# Patient Record
Sex: FEMALE | Race: WHITE | NOT HISPANIC OR LATINO | Employment: OTHER | URBAN - METROPOLITAN AREA
[De-identification: names, ages, dates, MRNs, and addresses within clinical notes are randomized per-mention and may not be internally consistent; named-entity substitution may affect disease eponyms.]

---

## 2017-03-27 ENCOUNTER — TELEPHONE (OUTPATIENT)
Dept: RHEUMATOLOGY | Facility: CLINIC | Age: 62
End: 2017-03-27

## 2022-11-08 ENCOUNTER — OFFICE VISIT (OUTPATIENT)
Dept: CARDIOLOGY | Facility: CLINIC | Age: 67
End: 2022-11-08
Payer: MEDICARE

## 2022-11-08 ENCOUNTER — LAB VISIT (OUTPATIENT)
Dept: LAB | Facility: HOSPITAL | Age: 67
End: 2022-11-08
Payer: MEDICARE

## 2022-11-08 VITALS
HEART RATE: 82 BPM | DIASTOLIC BLOOD PRESSURE: 81 MMHG | HEIGHT: 60 IN | BODY MASS INDEX: 38.04 KG/M2 | OXYGEN SATURATION: 96 % | SYSTOLIC BLOOD PRESSURE: 155 MMHG

## 2022-11-08 DIAGNOSIS — R07.89 CHEST DISCOMFORT: ICD-10-CM

## 2022-11-08 DIAGNOSIS — R79.9 ABNORMAL FINDING OF BLOOD CHEMISTRY, UNSPECIFIED: ICD-10-CM

## 2022-11-08 DIAGNOSIS — R06.02 SOB (SHORTNESS OF BREATH): ICD-10-CM

## 2022-11-08 DIAGNOSIS — R06.09 DOE (DYSPNEA ON EXERTION): ICD-10-CM

## 2022-11-08 DIAGNOSIS — I10 PRIMARY HYPERTENSION: ICD-10-CM

## 2022-11-08 DIAGNOSIS — R07.89 CHEST TIGHTNESS: ICD-10-CM

## 2022-11-08 DIAGNOSIS — R06.02 SOB (SHORTNESS OF BREATH): Primary | ICD-10-CM

## 2022-11-08 LAB
ANION GAP SERPL CALC-SCNC: 14 MMOL/L (ref 8–16)
BASOPHILS # BLD AUTO: 0.04 K/UL (ref 0–0.2)
BASOPHILS NFR BLD: 0.5 % (ref 0–1.9)
BUN SERPL-MCNC: 36 MG/DL (ref 8–23)
CALCIUM SERPL-MCNC: 10 MG/DL (ref 8.7–10.5)
CHLORIDE SERPL-SCNC: 108 MMOL/L (ref 95–110)
CHOLEST SERPL-MCNC: 188 MG/DL (ref 120–199)
CHOLEST/HDLC SERPL: 4.4 {RATIO} (ref 2–5)
CO2 SERPL-SCNC: 21 MMOL/L (ref 23–29)
CREAT SERPL-MCNC: 1.2 MG/DL (ref 0.5–1.4)
DIFFERENTIAL METHOD: ABNORMAL
EOSINOPHIL # BLD AUTO: 0.4 K/UL (ref 0–0.5)
EOSINOPHIL NFR BLD: 5.2 % (ref 0–8)
ERYTHROCYTE [DISTWIDTH] IN BLOOD BY AUTOMATED COUNT: 13 % (ref 11.5–14.5)
EST. GFR  (NO RACE VARIABLE): 49 ML/MIN/1.73 M^2
ESTIMATED AVG GLUCOSE: 114 MG/DL (ref 68–131)
GLUCOSE SERPL-MCNC: 90 MG/DL (ref 70–110)
HBA1C MFR BLD: 5.6 % (ref 4–5.6)
HCT VFR BLD AUTO: 39.5 % (ref 37–48.5)
HDLC SERPL-MCNC: 43 MG/DL (ref 40–75)
HDLC SERPL: 22.9 % (ref 20–50)
HGB BLD-MCNC: 12.5 G/DL (ref 12–16)
IMM GRANULOCYTES # BLD AUTO: 0.03 K/UL (ref 0–0.04)
IMM GRANULOCYTES NFR BLD AUTO: 0.4 % (ref 0–0.5)
LDLC SERPL CALC-MCNC: 100.4 MG/DL (ref 63–159)
LYMPHOCYTES # BLD AUTO: 1.8 K/UL (ref 1–4.8)
LYMPHOCYTES NFR BLD: 24.3 % (ref 18–48)
MCH RBC QN AUTO: 30.4 PG (ref 27–31)
MCHC RBC AUTO-ENTMCNC: 31.6 G/DL (ref 32–36)
MCV RBC AUTO: 96 FL (ref 82–98)
MONOCYTES # BLD AUTO: 0.7 K/UL (ref 0.3–1)
MONOCYTES NFR BLD: 8.9 % (ref 4–15)
NEUTROPHILS # BLD AUTO: 4.6 K/UL (ref 1.8–7.7)
NEUTROPHILS NFR BLD: 60.7 % (ref 38–73)
NONHDLC SERPL-MCNC: 145 MG/DL
NRBC BLD-RTO: 0 /100 WBC
PLATELET # BLD AUTO: 356 K/UL (ref 150–450)
PMV BLD AUTO: 10.6 FL (ref 9.2–12.9)
POTASSIUM SERPL-SCNC: 4.5 MMOL/L (ref 3.5–5.1)
RBC # BLD AUTO: 4.11 M/UL (ref 4–5.4)
SODIUM SERPL-SCNC: 143 MMOL/L (ref 136–145)
TRIGL SERPL-MCNC: 223 MG/DL (ref 30–150)
WBC # BLD AUTO: 7.52 K/UL (ref 3.9–12.7)

## 2022-11-08 PROCEDURE — 99999 PR PBB SHADOW E&M-NEW PATIENT-LVL IV: CPT | Mod: PBBFAC,GC,, | Performed by: INTERNAL MEDICINE

## 2022-11-08 PROCEDURE — 3288F FALL RISK ASSESSMENT DOCD: CPT | Mod: CPTII,GC,S$GLB, | Performed by: INTERNAL MEDICINE

## 2022-11-08 PROCEDURE — 3079F PR MOST RECENT DIASTOLIC BLOOD PRESSURE 80-89 MM HG: ICD-10-PCS | Mod: CPTII,GC,S$GLB, | Performed by: INTERNAL MEDICINE

## 2022-11-08 PROCEDURE — 83036 HEMOGLOBIN GLYCOSYLATED A1C: CPT | Performed by: INTERNAL MEDICINE

## 2022-11-08 PROCEDURE — 3079F DIAST BP 80-89 MM HG: CPT | Mod: CPTII,GC,S$GLB, | Performed by: INTERNAL MEDICINE

## 2022-11-08 PROCEDURE — 4010F ACE/ARB THERAPY RXD/TAKEN: CPT | Mod: CPTII,GC,S$GLB, | Performed by: INTERNAL MEDICINE

## 2022-11-08 PROCEDURE — 99999 PR PBB SHADOW E&M-NEW PATIENT-LVL IV: ICD-10-PCS | Mod: PBBFAC,GC,, | Performed by: INTERNAL MEDICINE

## 2022-11-08 PROCEDURE — 3077F SYST BP >= 140 MM HG: CPT | Mod: CPTII,GC,S$GLB, | Performed by: INTERNAL MEDICINE

## 2022-11-08 PROCEDURE — 1101F PR PT FALLS ASSESS DOC 0-1 FALLS W/OUT INJ PAST YR: ICD-10-PCS | Mod: CPTII,GC,S$GLB, | Performed by: INTERNAL MEDICINE

## 2022-11-08 PROCEDURE — 3044F HG A1C LEVEL LT 7.0%: CPT | Mod: CPTII,GC,S$GLB, | Performed by: INTERNAL MEDICINE

## 2022-11-08 PROCEDURE — 80061 LIPID PANEL: CPT | Performed by: INTERNAL MEDICINE

## 2022-11-08 PROCEDURE — 85025 COMPLETE CBC W/AUTO DIFF WBC: CPT | Performed by: INTERNAL MEDICINE

## 2022-11-08 PROCEDURE — 99204 PR OFFICE/OUTPT VISIT, NEW, LEVL IV, 45-59 MIN: ICD-10-PCS | Mod: 25,GC,S$GLB, | Performed by: INTERNAL MEDICINE

## 2022-11-08 PROCEDURE — 1159F MED LIST DOCD IN RCRD: CPT | Mod: CPTII,GC,S$GLB, | Performed by: INTERNAL MEDICINE

## 2022-11-08 PROCEDURE — 1126F PR PAIN SEVERITY QUANTIFIED, NO PAIN PRESENT: ICD-10-PCS | Mod: CPTII,GC,S$GLB, | Performed by: INTERNAL MEDICINE

## 2022-11-08 PROCEDURE — 3077F PR MOST RECENT SYSTOLIC BLOOD PRESSURE >= 140 MM HG: ICD-10-PCS | Mod: CPTII,GC,S$GLB, | Performed by: INTERNAL MEDICINE

## 2022-11-08 PROCEDURE — 1126F AMNT PAIN NOTED NONE PRSNT: CPT | Mod: CPTII,GC,S$GLB, | Performed by: INTERNAL MEDICINE

## 2022-11-08 PROCEDURE — 99204 OFFICE O/P NEW MOD 45 MIN: CPT | Mod: 25,GC,S$GLB, | Performed by: INTERNAL MEDICINE

## 2022-11-08 PROCEDURE — 3008F BODY MASS INDEX DOCD: CPT | Mod: CPTII,GC,S$GLB, | Performed by: INTERNAL MEDICINE

## 2022-11-08 PROCEDURE — 1159F PR MEDICATION LIST DOCUMENTED IN MEDICAL RECORD: ICD-10-PCS | Mod: CPTII,GC,S$GLB, | Performed by: INTERNAL MEDICINE

## 2022-11-08 PROCEDURE — 80048 BASIC METABOLIC PNL TOTAL CA: CPT | Performed by: INTERNAL MEDICINE

## 2022-11-08 PROCEDURE — 1101F PT FALLS ASSESS-DOCD LE1/YR: CPT | Mod: CPTII,GC,S$GLB, | Performed by: INTERNAL MEDICINE

## 2022-11-08 PROCEDURE — 3044F PR MOST RECENT HEMOGLOBIN A1C LEVEL <7.0%: ICD-10-PCS | Mod: CPTII,GC,S$GLB, | Performed by: INTERNAL MEDICINE

## 2022-11-08 PROCEDURE — 3288F PR FALLS RISK ASSESSMENT DOCUMENTED: ICD-10-PCS | Mod: CPTII,GC,S$GLB, | Performed by: INTERNAL MEDICINE

## 2022-11-08 PROCEDURE — 93000 ELECTROCARDIOGRAM COMPLETE: CPT | Mod: S$GLB,,, | Performed by: INTERNAL MEDICINE

## 2022-11-08 PROCEDURE — 93000 EKG 12-LEAD: ICD-10-PCS | Mod: S$GLB,,, | Performed by: INTERNAL MEDICINE

## 2022-11-08 PROCEDURE — 3008F PR BODY MASS INDEX (BMI) DOCUMENTED: ICD-10-PCS | Mod: CPTII,GC,S$GLB, | Performed by: INTERNAL MEDICINE

## 2022-11-08 PROCEDURE — 4010F PR ACE/ARB THEARPY RXD/TAKEN: ICD-10-PCS | Mod: CPTII,GC,S$GLB, | Performed by: INTERNAL MEDICINE

## 2022-11-08 PROCEDURE — 36415 COLL VENOUS BLD VENIPUNCTURE: CPT | Performed by: INTERNAL MEDICINE

## 2022-11-08 RX ORDER — LOSARTAN POTASSIUM AND HYDROCHLOROTHIAZIDE 25; 100 MG/1; MG/1
1 TABLET ORAL DAILY
COMMUNITY

## 2022-11-08 RX ORDER — NITROGLYCERIN 0.4 MG/1
0.4 TABLET SUBLINGUAL
Status: SHIPPED | OUTPATIENT
Start: 2022-11-08

## 2022-11-08 NOTE — PROGRESS NOTES
Cardiology Clinic Note  Reason for Visit: Shortness of Breath    HPI:   Mrs Mathias is a 68 yo F with PMH of HTN, OA, GERD/gastritis presenting with shortness of breath.     She was experienced LAZAR with associated chest tightness, intermittent leg swelling with associated weight gain, and intermittent palpitations over the past 1-2 years. 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     She states symptoms started around last summer (June 2021) around the time she received her COVID booster. She unfortunately. Lost her job when COVID hit in 2020, and has been a much less active lifestyle since that time. She describes shortness of breath with exertion including examples such as walking to the garage or sweeping in the kitchen. Even walking from the lobby to the room has her SOB today. She intermittently has associated chest tightness and pain into her R arm. She feels palpitations sometimes when exerting herself too, but no diaphoresis, syncope, or falls. She had a stress test many years ago and reports it was normal. No Hx of premature CAD and non-smoker.     ROS:    Constitution: Negative for fever, chills, weight loss or gain.   HENT: Negative for sore throat, rhinorrhea, or headache.  Eyes: Negative for blurred or double vision.   Cardiovascular: See above  Pulmonary: Negative for SOB   Gastrointestinal: Negative for abdominal pain, nausea, vomiting, or diarrhea.   : Negative for dysuria.   Neurological: Negative for focal weakness or sensory changes.  PMH:     Past Medical History:   Diagnosis Date    Allergy     Arthritis     Encounter for blood transfusion     Gastric ulcer     Hypertension     Migraine headache      Past Surgical History:   Procedure Laterality Date    HYSTERECTOMY      TONSILLECTOMY       Allergies:     Review of patient's allergies indicates:   Allergen  Reactions    Latex, natural rubber Other (See Comments)     Blisters      Macrolide antibiotics Nausea And Vomiting     Erythromycin      Penicillins Itching    Sulfa (sulfonamide antibiotics) Itching     Medications:     Current Outpatient Medications on File Prior to Visit   Medication Sig Dispense Refill    diclofenac (VOLTAREN) 50 MG EC tablet TAKE 1 TABLET TWICE DAILY (Patient taking differently: 100 mg.) 180 tablet 0    fenofibrate (TRICOR) 145 MG tablet Take 1 tablet by mouth once daily. 30 tablet 3    losartan-hydrochlorothiazide 100-25 mg (HYZAAR) 100-25 mg per tablet Take 1 tablet by mouth once daily.      multivitamin capsule Take 1 capsule by mouth once daily.      omeprazole (PRILOSEC) 40 MG capsule Take 1 capsule (40 mg total) by mouth once daily. 90 capsule 3    topiramate (TOPAMAX) 50 MG tablet Take 1 tablet (50 mg total) by mouth 2 (two) times daily. 90 tablet 3    lisinopril-hydrochlorothiazide (PRINZIDE,ZESTORETIC) 10-12.5 mg per tablet Take 1 tablet by mouth once daily. (Patient not taking: Reported on 11/8/2022) 90 tablet 3    sumatriptan (IMITREX) 50 MG tablet Take by mouth as needed.      tramadol (ULTRAM) 50 mg tablet TAKE ONE TABLET BY MOUTH THREE TIMES DAILY AS NEEDED (Patient not taking: Reported on 11/8/2022) 90 tablet 3     No current facility-administered medications on file prior to visit.     Social History:     Social History     Tobacco Use    Smoking status: Never    Smokeless tobacco: Never   Substance Use Topics    Alcohol use: No     Alcohol/week: 0.0 standard drinks     Family History:     Family History   Problem Relation Age of Onset    Cancer Mother     Rheum arthritis Mother     Heart disease Mother     Cancer Father 74        colon    Heart failure Father     Stroke Father     Alzheimer's disease Father     No Known Problems Sister     No Known Problems Daughter     No Known Problems Son      Physical Exam:   BP (!) 155/81 (BP Location: Left arm, Patient Position: Sitting,  BP Method: Large (Automatic))   Pulse 82   Ht 5' (1.524 m)   LMP  (LMP Unknown)   SpO2 96%   BMI 38.04 kg/m²        Physical Exam   Constitutional: She is oriented to person, place, and time.   HENT:   Head: Normocephalic.   Cardiovascular: Normal rate and regular rhythm.   Murmur heard.  Holosystolic murmur is present at the lower left sternal border.  Pulses:       Radial pulses are 2+ on the right side and 2+ on the left side.        Dorsalis pedis pulses are 2+ on the right side and 2+ on the left side.        Posterior tibial pulses are 2+ on the right side and 2+ on the left side.   Pulmonary/Chest: Effort normal and breath sounds normal. No respiratory distress. She has no rales.   Abdominal: Soft. Normal appearance and bowel sounds are normal.   Musculoskeletal:      Cervical back: Neck supple.      Right lower leg: No edema.      Left lower leg: No edema.   Neurological: She is alert and oriented to person, place, and time.   Skin: Skin is warm.      Labs:     Lab Results   Component Value Date     11/08/2022    K 4.5 11/08/2022     11/08/2022    CO2 21 (L) 11/08/2022    BUN 36 (H) 11/08/2022    CREATININE 1.2 11/08/2022    ANIONGAP 14 11/08/2022     Lab Results   Component Value Date    HGBA1C 5.6 11/08/2022     Lab Results   Component Value Date    BNP 24 03/10/2014    Lab Results   Component Value Date    WBC 7.52 11/08/2022    HGB 12.5 11/08/2022    HCT 39.5 11/08/2022     11/08/2022    GRAN 4.6 11/08/2022    GRAN 60.7 11/08/2022     Lab Results   Component Value Date    CHOL 188 11/08/2022    HDL 43 11/08/2022    LDLCALC 100.4 11/08/2022    TRIG 223 (H) 11/08/2022          Imaging:   TTE 3/2014    1 - Normal left ventricular systolic function (EF 60-65%).     2 - Normal left ventricular diastolic function.     3 - Mild tricuspid regurgitation.     EKG 2014  NSR, incomplete RBBB  Assessment:     1. SOB (shortness of breath)  IN OFFICE EKG 12-LEAD (to Muse)    CBC Auto  Differential    BASIC METABOLIC PANEL    HEMOGLOBIN A1C    Lipid Panel    Stress Echo Which stress agent will be used? Treadmill Exercise; Color Flow Doppler? No      2. Chest discomfort  HEMOGLOBIN A1C    Lipid Panel    Stress Echo Which stress agent will be used? Treadmill Exercise; Color Flow Doppler? No      3. Primary hypertension        4. Abnormal finding of blood chemistry, unspecified  HEMOGLOBIN A1C          Plan:   Chest Discomfort  Dyspnea on Exertions  Palpitations  Symptoms concerning for angina; normal stress test reportedly previously  EKG in office with NSR, incomplete RBBB, non-specific ST changes, but no evidence of ischemia  Schedule exercise stress echocardiogram  Check CBC, BMP, BNP, lipid panel, and HbA1c    HTN (hypertension)  Continue Losartan-HCTZ 100-25 mg daily  Low salt diet    RTC PRN pending stress test results.    Discussed case with Dr Yanci Ojeda MD.    Tomer Gonzalez MD PGY5  Cardiovascular Medicine Fellow  [facility]  Pager: [phone]

## 2022-11-10 ENCOUNTER — HOSPITAL ENCOUNTER (OUTPATIENT)
Dept: CARDIOLOGY | Facility: HOSPITAL | Age: 67
Discharge: HOME | End: 2022-11-10
Attending: INTERNAL MEDICINE
Payer: MEDICARE

## 2022-11-10 VITALS — WEIGHT: 194 LBS | HEIGHT: 60 IN | BODY MASS INDEX: 38.09 KG/M2

## 2022-11-10 DIAGNOSIS — R07.89 CHEST DISCOMFORT: ICD-10-CM

## 2022-11-10 DIAGNOSIS — R06.02 SOB (SHORTNESS OF BREATH): ICD-10-CM

## 2022-11-10 LAB
ASCENDING AORTA: 3.06 CM
BSA FOR ECHO PROCEDURE: 1.93 M2
CV ECHO LV RWT: 0.32 CM
CV STRESS BASE HR: 71 BPM
DIASTOLIC BLOOD PRESSURE: 78 MMHG
DOP CALC LVOT AREA: 3.8 CM2
DOP CALC LVOT DIAMETER: 2.21 CM
E WAVE DECELERATION TIME: 231.12 MSEC
E/A RATIO: 0.8
E/E' RATIO: 6.6 M/S
ECHO LV POSTERIOR WALL: 0.76 CM (ref 0.6–1.1)
EJECTION FRACTION: 65 %
FRACTIONAL SHORTENING: 34 % (ref 28–44)
INTERVENTRICULAR SEPTUM: 0.78 CM (ref 0.6–1.1)
LA MAJOR: 4.76 CM
LA MINOR: 3.89 CM
LA WIDTH: 3.39 CM
LEFT ATRIUM SIZE: 2.97 CM
LEFT ATRIUM VOLUME INDEX MOD: 20.9 ML/M2
LEFT ATRIUM VOLUME INDEX: 19.9 ML/M2
LEFT ATRIUM VOLUME MOD: 38.42 CM3
LEFT ATRIUM VOLUME: 36.64 CM3
LEFT INTERNAL DIMENSION IN SYSTOLE: 3.09 CM (ref 2.1–4)
LEFT VENTRICLE DIASTOLIC VOLUME INDEX: 55.49 ML/M2
LEFT VENTRICLE DIASTOLIC VOLUME: 102.11 ML
LEFT VENTRICLE MASS INDEX: 63 G/M2
LEFT VENTRICLE SYSTOLIC VOLUME INDEX: 20.5 ML/M2
LEFT VENTRICLE SYSTOLIC VOLUME: 37.77 ML
LEFT VENTRICULAR INTERNAL DIMENSION IN DIASTOLE: 4.7 CM (ref 3.5–6)
LEFT VENTRICULAR MASS: 116.37 G
LV LATERAL E/E' RATIO: 6.6 M/S
LV SEPTAL E/E' RATIO: 6.6 M/S
MV A" WAVE DURATION": 11.7 MSEC
MV PEAK A VEL: 0.82 M/S
MV PEAK E VEL: 0.66 M/S
MV STENOSIS PRESSURE HALF TIME: 67.03 MS
MV VALVE AREA P 1/2 METHOD: 3.28 CM2
OHS CV CPX 1 MINUTE RECOVERY HEART RATE: 131 BPM
OHS CV CPX 85 PERCENT MAX PREDICTED HEART RATE MALE: 123
OHS CV CPX ESTIMATED METS: 7
OHS CV CPX MAX PREDICTED HEART RATE: 145
OHS CV CPX PATIENT IS FEMALE: 1
OHS CV CPX PATIENT IS MALE: 0
OHS CV CPX PEAK DIASTOLIC BLOOD PRESSURE: 88 MMHG
OHS CV CPX PEAK HEAR RATE: 148 BPM
OHS CV CPX PEAK RATE PRESSURE PRODUCT: NORMAL
OHS CV CPX PEAK SYSTOLIC BLOOD PRESSURE: 162 MMHG
OHS CV CPX PERCENT MAX PREDICTED HEART RATE ACHIEVED: 102
OHS CV CPX RATE PRESSURE PRODUCT PRESENTING: 9088
PISA TR MAX VEL: 2.27 M/S
PULM VEIN S/D RATIO: 0.96
PV PEAK D VEL: 0.45 M/S
PV PEAK S VEL: 0.43 M/S
RA MAJOR: 4.76 CM
RA PRESSURE: 3 MMHG
RA WIDTH: 3.02 CM
RIGHT VENTRICULAR END-DIASTOLIC DIMENSION: 2.65 CM
RV TISSUE DOPPLER FREE WALL SYSTOLIC VELOCITY 1 (APICAL 4 CHAMBER VIEW): 12.47 CM/S
SINUS: 2.63 CM
STJ: 2.82 CM
STRESS ECHO POST EXERCISE DUR MIN: 4 MINUTES
STRESS ECHO POST EXERCISE DUR SEC: 15 SECONDS
SYSTOLIC BLOOD PRESSURE: 128 MMHG
TDI LATERAL: 0.1 M/S
TDI SEPTAL: 0.1 M/S
TDI: 0.1 M/S
TR MAX PG: 21 MMHG
TRICUSPID ANNULAR PLANE SYSTOLIC EXCURSION: 2.27 CM
TV REST PULMONARY ARTERY PRESSURE: 24 MMHG

## 2022-11-10 PROCEDURE — 93351 STRESS TTE COMPLETE: CPT | Mod: 26,,, | Performed by: INTERNAL MEDICINE

## 2022-11-10 PROCEDURE — 93351 STRESS ECHO (CUPID ONLY): ICD-10-PCS | Mod: 26,,, | Performed by: INTERNAL MEDICINE

## 2022-11-10 PROCEDURE — 93351 STRESS TTE COMPLETE: CPT

## 2022-11-13 ENCOUNTER — PATIENT MESSAGE (OUTPATIENT)
Dept: CARDIOLOGY | Facility: CLINIC | Age: 67
End: 2022-11-13
Payer: MEDICARE

## 2022-11-14 ENCOUNTER — TELEPHONE (OUTPATIENT)
Dept: CARDIOLOGY | Facility: HOSPITAL | Age: 67
End: 2022-11-14
Payer: MEDICARE

## 2022-11-14 NOTE — TELEPHONE ENCOUNTER
Called patient to discuss labs and stress test.     Labs with Cr 1.2 and . Last labs were 6 yrs ago here (has outside PCP though) with Cr 0.7 and . She has some soreness/discomfort in her lower back and neck/jaw, but otherwise is doing ok.     Advised to contact PCP for UA to monitor renal function and to say hydrated in the meantime. MEENA with normal EF, negative for ischemia. Advised to start exercise program with dietary changes we had discussed previously. Also asked to keep me posted in neck/jaw symptoms persist with exercise and the try topical anti-inflammatories in the meantime.     Will follow-up in clinic, but reminded her I'm always available if needing to discuss anything further.    Tomer Gonzalez MD PGY5  Cardiovascular Medicine Fellow  [facility]  Pager: [phone]

## 2022-11-16 DIAGNOSIS — R94.4 ABNORMAL RENAL FUNCTION TEST: Primary | ICD-10-CM

## 2023-07-31 ENCOUNTER — TELEPHONE (OUTPATIENT)
Dept: OPHTHALMOLOGY | Facility: CLINIC | Age: 68
End: 2023-07-31
Payer: MEDICARE

## 2023-07-31 NOTE — TELEPHONE ENCOUNTER
----- Message from Jeane Hernandez sent at 7/31/2023 11:41 AM CDT -----  Regarding: Urgent Appt  Contact: Pt  Pt is requesting a callback regarding R eye pain. Pt stated she also have some brown spotting and irritation. Please adv pt      Confirmed contact below:   Contact Name:Danyell Mathias  Phone Number: [phone]

## 2023-11-29 ENCOUNTER — OFFICE VISIT (OUTPATIENT)
Dept: GASTROENTEROLOGY | Facility: CLINIC | Age: 68
End: 2023-11-29
Payer: MEDICARE

## 2023-11-29 VITALS — WEIGHT: 194 LBS | HEIGHT: 60 IN | BODY MASS INDEX: 38.09 KG/M2

## 2023-11-29 DIAGNOSIS — R19.5 POSITIVE FIT (FECAL IMMUNOCHEMICAL TEST): Primary | ICD-10-CM

## 2023-11-29 DIAGNOSIS — Z80.0 FAMILY HISTORY OF COLON CANCER IN FATHER: ICD-10-CM

## 2023-11-29 PROCEDURE — 1159F PR MEDICATION LIST DOCUMENTED IN MEDICAL RECORD: ICD-10-PCS | Mod: CPTII,S$GLB,,

## 2023-11-29 PROCEDURE — 99999 PR PBB SHADOW E&M-EST. PATIENT-LVL III: ICD-10-PCS | Mod: PBBFAC,,,

## 2023-11-29 PROCEDURE — 3008F BODY MASS INDEX DOCD: CPT | Mod: CPTII,S$GLB,,

## 2023-11-29 PROCEDURE — 1159F MED LIST DOCD IN RCRD: CPT | Mod: CPTII,S$GLB,,

## 2023-11-29 PROCEDURE — 99203 OFFICE O/P NEW LOW 30 MIN: CPT | Mod: S$GLB,,,

## 2023-11-29 PROCEDURE — 1101F PT FALLS ASSESS-DOCD LE1/YR: CPT | Mod: CPTII,S$GLB,,

## 2023-11-29 PROCEDURE — 99999 PR PBB SHADOW E&M-EST. PATIENT-LVL III: CPT | Mod: PBBFAC,,,

## 2023-11-29 PROCEDURE — 1126F PR PAIN SEVERITY QUANTIFIED, NO PAIN PRESENT: ICD-10-PCS | Mod: CPTII,S$GLB,,

## 2023-11-29 PROCEDURE — 1126F AMNT PAIN NOTED NONE PRSNT: CPT | Mod: CPTII,S$GLB,,

## 2023-11-29 PROCEDURE — 1101F PR PT FALLS ASSESS DOC 0-1 FALLS W/OUT INJ PAST YR: ICD-10-PCS | Mod: CPTII,S$GLB,,

## 2023-11-29 PROCEDURE — 3288F PR FALLS RISK ASSESSMENT DOCUMENTED: ICD-10-PCS | Mod: CPTII,S$GLB,,

## 2023-11-29 PROCEDURE — 99203 PR OFFICE/OUTPT VISIT, NEW, LEVL III, 30-44 MIN: ICD-10-PCS | Mod: S$GLB,,,

## 2023-11-29 PROCEDURE — 3288F FALL RISK ASSESSMENT DOCD: CPT | Mod: CPTII,S$GLB,,

## 2023-11-29 PROCEDURE — 3008F PR BODY MASS INDEX (BMI) DOCUMENTED: ICD-10-PCS | Mod: CPTII,S$GLB,,

## 2023-11-29 RX ORDER — IBUPROFEN 100 MG/5ML
1000 SUSPENSION, ORAL (FINAL DOSE FORM) ORAL DAILY
COMMUNITY

## 2023-11-29 RX ORDER — LEVOTHYROXINE SODIUM 75 UG/1
75 TABLET ORAL
COMMUNITY
Start: 2023-09-13

## 2023-11-29 RX ORDER — VITAMIN E 268 MG
400 CAPSULE ORAL DAILY
COMMUNITY

## 2023-11-29 RX ORDER — POLYETHYLENE GLYCOL 3350, SODIUM SULFATE ANHYDROUS, SODIUM BICARBONATE, SODIUM CHLORIDE, POTASSIUM CHLORIDE 236; 22.74; 6.74; 5.86; 2.97 G/4L; G/4L; G/4L; G/4L; G/4L
4 POWDER, FOR SOLUTION ORAL ONCE
Qty: 1 EACH | Refills: 0 | Status: SHIPPED | OUTPATIENT
Start: 2023-11-29 | End: 2023-11-29

## 2023-11-29 NOTE — H&P (VIEW-ONLY)
GASTROENTEROLOGY CLINIC NOTE    Reason for visit: The primary encounter diagnosis was Positive FIT (fecal immunochemical test). A diagnosis of Family history of colon cancer in father was also pertinent to this visit.  Referring provider/PCP: Louis Aleman MD    HPI:  Danyell Mathias is a 68 y.o. female here today for positive FIT. She reports having colonoscopy 8 years ago that was normal. Has been doing annual FIT since then, last year was negative. Has daily BM's without issue. No abd pain, no change in BM's, no blood in stool. FH of CRC in father and paternal aunts.     Prior Endoscopy:  EGD:  Colon:    (Portions of this note were dictated using voice recognition software and may contain dictation related errors in spelling/grammar/syntax not found on text review)    Review of Systems   Gastrointestinal:  Negative for abdominal pain, blood in stool, constipation and diarrhea.       Past Medical History: has a past medical history of Allergy, Arthritis, Encounter for blood transfusion, Gastric ulcer, Hypertension, and Migraine headache.    Past Surgical History: has a past surgical history that includes Hysterectomy and Tonsillectomy.    Home medications:   Current Outpatient Medications on File Prior to Visit   Medication Sig Dispense Refill    ascorbic acid, vitamin C, (VITAMIN C) 1000 MG tablet Take 1,000 mg by mouth once daily.      B6-folic-B12-coffee-phosphatid 1.7 mg-400 mcg- 2.4 mcg Cap Take by mouth.      diclofenac (VOLTAREN) 50 MG EC tablet TAKE 1 TABLET TWICE DAILY (Patient taking differently: 100 mg.) 180 tablet 0    fenofibrate (TRICOR) 145 MG tablet Take 1 tablet by mouth once daily. 30 tablet 3    levothyroxine (SYNTHROID) 75 MCG tablet Take 75 mcg by mouth.      losartan-hydrochlorothiazide 100-25 mg (HYZAAR) 100-25 mg per tablet Take 1 tablet by mouth once daily.      multivitamin capsule Take 1 capsule by mouth once daily.      omeprazole (PRILOSEC) 40 MG capsule Take 1 capsule (40 mg  total) by mouth once daily. 90 capsule 3    topiramate (TOPAMAX) 50 MG tablet Take 1 tablet (50 mg total) by mouth 2 (two) times daily. 90 tablet 3    vitamin E 400 UNIT capsule Take 400 Units by mouth once daily.      lisinopril-hydrochlorothiazide (PRINZIDE,ZESTORETIC) 10-12.5 mg per tablet Take 1 tablet by mouth once daily. (Patient not taking: Reported on 11/8/2022) 90 tablet 3    sumatriptan (IMITREX) 50 MG tablet Take by mouth as needed.      tramadol (ULTRAM) 50 mg tablet TAKE ONE TABLET BY MOUTH THREE TIMES DAILY AS NEEDED (Patient not taking: Reported on 11/8/2022) 90 tablet 3     Current Facility-Administered Medications on File Prior to Visit   Medication Dose Route Frequency Provider Last Rate Last Admin    nitroGLYCERIN SL tablet 0.4 mg  0.4 mg Sublingual 1 time in Clinic/HOD Tomer Gonzalez MD           Vital signs:  Ht 5' (1.524 m)   Wt 88 kg (194 lb 0.1 oz)   LMP  (LMP Unknown)   BMI 37.89 kg/m²     Physical Exam  Constitutional:       Appearance: Normal appearance. She is obese.   Abdominal:      General: There is no distension.   Neurological:      Mental Status: She is alert.       I have reviewed associated labs, imaging and notes.     Assessment:  1. Positive FIT (fecal immunochemical test)    2. Family history of colon cancer in father    (+) FIT   Last colonoscopy 8 years ago normal   FIT last year negative  No warning signs present  FH of CRC in father and paternal aunts    Plan:  Orders Placed This Encounter    polyethylene glycol (GOLYTELY) 236-22.74-6.74 -5.86 gram suspension    Case Request Endoscopy: COLONOSCOPY     Schedule colonoscopy for further evaluation   Chacorta Lamas NP  [facility]

## 2023-11-29 NOTE — PROGRESS NOTES
GASTROENTEROLOGY CLINIC NOTE    Reason for visit: The primary encounter diagnosis was Positive FIT (fecal immunochemical test). A diagnosis of Family history of colon cancer in father was also pertinent to this visit.  Referring provider/PCP: Louis Aleman MD    HPI:  Danyell Mathias is a 68 y.o. female here today for positive FIT. She reports having colonoscopy 8 years ago that was normal. Has been doing annual FIT since then, last year was negative. Has daily BM's without issue. No abd pain, no change in BM's, no blood in stool. FH of CRC in father and paternal aunts.     Prior Endoscopy:  EGD:  Colon:    (Portions of this note were dictated using voice recognition software and may contain dictation related errors in spelling/grammar/syntax not found on text review)    Review of Systems   Gastrointestinal:  Negative for abdominal pain, blood in stool, constipation and diarrhea.       Past Medical History: has a past medical history of Allergy, Arthritis, Encounter for blood transfusion, Gastric ulcer, Hypertension, and Migraine headache.    Past Surgical History: has a past surgical history that includes Hysterectomy and Tonsillectomy.    Home medications:   Current Outpatient Medications on File Prior to Visit   Medication Sig Dispense Refill    ascorbic acid, vitamin C, (VITAMIN C) 1000 MG tablet Take 1,000 mg by mouth once daily.      B6-folic-B12-coffee-phosphatid 1.7 mg-400 mcg- 2.4 mcg Cap Take by mouth.      diclofenac (VOLTAREN) 50 MG EC tablet TAKE 1 TABLET TWICE DAILY (Patient taking differently: 100 mg.) 180 tablet 0    fenofibrate (TRICOR) 145 MG tablet Take 1 tablet by mouth once daily. 30 tablet 3    levothyroxine (SYNTHROID) 75 MCG tablet Take 75 mcg by mouth.      losartan-hydrochlorothiazide 100-25 mg (HYZAAR) 100-25 mg per tablet Take 1 tablet by mouth once daily.      multivitamin capsule Take 1 capsule by mouth once daily.      omeprazole (PRILOSEC) 40 MG capsule Take 1 capsule (40 mg  total) by mouth once daily. 90 capsule 3    topiramate (TOPAMAX) 50 MG tablet Take 1 tablet (50 mg total) by mouth 2 (two) times daily. 90 tablet 3    vitamin E 400 UNIT capsule Take 400 Units by mouth once daily.      lisinopril-hydrochlorothiazide (PRINZIDE,ZESTORETIC) 10-12.5 mg per tablet Take 1 tablet by mouth once daily. (Patient not taking: Reported on 11/8/2022) 90 tablet 3    sumatriptan (IMITREX) 50 MG tablet Take by mouth as needed.      tramadol (ULTRAM) 50 mg tablet TAKE ONE TABLET BY MOUTH THREE TIMES DAILY AS NEEDED (Patient not taking: Reported on 11/8/2022) 90 tablet 3     Current Facility-Administered Medications on File Prior to Visit   Medication Dose Route Frequency Provider Last Rate Last Admin    nitroGLYCERIN SL tablet 0.4 mg  0.4 mg Sublingual 1 time in Clinic/HOD Tomer Gonzalez MD           Vital signs:  Ht 5' (1.524 m)   Wt 88 kg (194 lb 0.1 oz)   LMP  (LMP Unknown)   BMI 37.89 kg/m²     Physical Exam  Constitutional:       Appearance: Normal appearance. She is obese.   Abdominal:      General: There is no distension.   Neurological:      Mental Status: She is alert.       I have reviewed associated labs, imaging and notes.     Assessment:  1. Positive FIT (fecal immunochemical test)    2. Family history of colon cancer in father    (+) FIT   Last colonoscopy 8 years ago normal   FIT last year negative  No warning signs present  FH of CRC in father and paternal aunts    Plan:  Orders Placed This Encounter    polyethylene glycol (GOLYTELY) 236-22.74-6.74 -5.86 gram suspension    Case Request Endoscopy: COLONOSCOPY     Schedule colonoscopy for further evaluation   Chacorta Lamas NP  [facility]

## 2023-11-29 NOTE — PATIENT INSTRUCTIONS
GOLYTELY/ COLYTE/ NULYTELY Prep Instructions    [facility] [address]  Clinic Office [phone]  Endoscopy Lab [phone]    You are scheduled for a Colonoscopy with Dr. Hooks on 12/13/2023 at [facility] in [place].    Check in at the Hospital -1st floor, Information desk.   Call [phone] to reschedule.    An adult friend/family member must come with you to drive you home.  You cannot drive, take a taxi, Uber/Lyft or bus to leave the Endoscopy Center alone.  If you do not have someone to drive you home, your test will be cancelled.     Please follow the directions of your doctor if you take any pills that thin your blood. If you take these meds: Aggrenox, Brilinta, Effient, Eliquis, Lovenox, Plavix, Pletal, Pradaxa, Ticilid, Xarelto or Coumadin, let the doctor's office know.    Please hold any GLP-1 medications prior to the procedure: Dulaglutide Trulicity(hold week prior), Exenatide Byetta (hold the morning of procedure), Semaglutide Ozempic (hold week prior), Liraglutide Victoza, Saxenda(hold week prior), Lixisenatide Adlyxin (hold the morning of procedure), Semaglutide Rybelsus (hold the morning of procedure), Tirzepatide Mounjaro (hold week prior)     DON'T: On the morning of the test do not take insulin or pills for diabetes.     DO: On the morning of the test, do take any pills for blood pressure, heart, anti-rejection and or seizures with a small sip of water. Bring any inhalers with you.    To have a good prep, you must follow these instructions - please do not use the directions from the pharmacy.    The doctor will send a prescription for the Golytely.      The Day Before the test:    You can only drink CLEAR LIQUIDS the whole day before your test.  You can't eat any food for the whole day.    You CAN have:  Water, Coffee or decaf coffee (no milk or cream)  Tea  Soft drinks - regular and sugar free  Jello (green or yellow)  Apple Juice, white grape  juice, white cranberry juice  Gatorade, Power Aid, Crystal Light, Vince Aid  Lemonade and Limeade  Bouillon, clear soup  Snowball, popsicles  YOU CAN'T DRINK ANYTHING RED, PURPLE ORANGE OR BLUE   YOU CAN'T DRINK ALCOHOL  ONLY DRINK WHAT IS ON THE LIST      At 12 noon,   Add water up to the line on the jug of GOLYTELY (should be 1 gallon when done).  You can add a packet of yellow/green powder drink mix to the jug.   Put the bottle in the refrigerator if you prefer. It should taste better if it is cold. Do NOT put this solution over ice. It is ok to drink with a straw.    At 5 pm the NIGHT before your test:    Drink 1 glass (8 ounces) every 10 minutes until 1/2 of the jug is finished.  Put the jug back in the refrigerator after you finish the first half, and don't drink any more until 5 hours before you come to the hospital (see below for more specific details).    You can continue to drink clear liquids until you go to sleep.    The Day of the test - We will call you 2 days before your test to tell you what time to get there.    5 hours before you come to the hospital (this may be in the middle of the night)  Drink 1 glass (8 ounces) every 10 minutes until the jug is finished.     YOU CAN'T EAT OR DRINK ANYTHING ELSE ONCE YOU FINISH THE PREP    Leave all valuables and jewelry at home. You will be at the hospital for 2-4 hours.    Call the Endoscopy department at [phone] with any questions about your procedure.

## 2023-12-05 ENCOUNTER — PATIENT MESSAGE (OUTPATIENT)
Dept: GASTROENTEROLOGY | Facility: CLINIC | Age: 68
End: 2023-12-05
Payer: MEDICARE

## 2023-12-05 DIAGNOSIS — R19.5 POSITIVE FIT (FECAL IMMUNOCHEMICAL TEST): Primary | ICD-10-CM

## 2023-12-05 RX ORDER — POLYETHYLENE GLYCOL 3350, SODIUM SULFATE ANHYDROUS, SODIUM BICARBONATE, SODIUM CHLORIDE, POTASSIUM CHLORIDE 236; 22.74; 6.74; 5.86; 2.97 G/4L; G/4L; G/4L; G/4L; G/4L
4 POWDER, FOR SOLUTION ORAL ONCE
Qty: 1 EACH | Refills: 0 | Status: SHIPPED | OUTPATIENT
Start: 2023-12-05 | End: 2023-12-05

## 2023-12-11 ENCOUNTER — TELEPHONE (OUTPATIENT)
Dept: ENDOSCOPY | Facility: HOSPITAL | Age: 68
End: 2023-12-11
Payer: MEDICARE

## 2023-12-11 NOTE — TELEPHONE ENCOUNTER
Spoke with patient about arrival time @ 1330.   Colon/Golytely    Prep instructions reviewed: the day before the procedure, follow a clear liquid diet all day, then start the first 1/2 of prep at 5pm and take 2nd 1/2 of prep @ 0830.  Pt must be completely NPO when prep completed @ 1030.  Golytely inst sent to portal.            Medications: Do not take Insulin or oral diabetic medications the day of the procedure.  Take as prescribed: heart, seizure and blood pressure medication in the morning with a sip of water (less than an ounce).  Take any breathing medications and bring inhalers to hospital with you Leave all valuables and jewelry at home.     Wear comfortable clothes to procedure to change into hospital gown You cannot drive for 24 hours after your procedure because you will receive sedation for your procedure to make you comfortable.  A ride must be provided at discharge.

## 2023-12-11 NOTE — TELEPHONE ENCOUNTER
Left messages instructing patient to call dept @ [phone] between 8am-3pm.    Arrival time to be given @ 1330  Bruno/Golytely (Inst AVS 11/29)  (Message sent via [facility] portal)

## 2023-12-13 ENCOUNTER — TELEPHONE (OUTPATIENT)
Dept: GASTROENTEROLOGY | Facility: HOSPITAL | Age: 68
End: 2023-12-13
Payer: MEDICARE

## 2023-12-13 ENCOUNTER — ANESTHESIA EVENT (OUTPATIENT)
Dept: ENDOSCOPY | Facility: HOSPITAL | Age: 68
End: 2023-12-13
Payer: MEDICARE

## 2023-12-13 ENCOUNTER — ANESTHESIA (OUTPATIENT)
Dept: ENDOSCOPY | Facility: HOSPITAL | Age: 68
End: 2023-12-13
Payer: MEDICARE

## 2023-12-13 ENCOUNTER — HOSPITAL ENCOUNTER (OUTPATIENT)
Facility: HOSPITAL | Age: 68
Discharge: HOME | End: 2023-12-13
Attending: INTERNAL MEDICINE | Admitting: INTERNAL MEDICINE
Payer: MEDICARE

## 2023-12-13 VITALS
TEMPERATURE: 98 F | HEART RATE: 80 BPM | BODY MASS INDEX: 43.43 KG/M2 | WEIGHT: 230 LBS | RESPIRATION RATE: 20 BRPM | DIASTOLIC BLOOD PRESSURE: 65 MMHG | SYSTOLIC BLOOD PRESSURE: 138 MMHG | OXYGEN SATURATION: 98 % | HEIGHT: 61 IN

## 2023-12-13 DIAGNOSIS — R10.9 ABDOMINAL PAIN, UNSPECIFIED ABDOMINAL LOCATION: Primary | ICD-10-CM

## 2023-12-13 DIAGNOSIS — Z12.11 COLON CANCER SCREENING: ICD-10-CM

## 2023-12-13 PROCEDURE — 45381 PR COLONOSCPY,FLEX,W/DIR SUBMUC INJECT: ICD-10-PCS | Mod: 51,,, | Performed by: INTERNAL MEDICINE

## 2023-12-13 PROCEDURE — D9220A PRA ANESTHESIA: Mod: ANES,,, | Performed by: ANESTHESIOLOGY

## 2023-12-13 PROCEDURE — 63600175 PHARM REV CODE 636 W HCPCS: Performed by: NURSE ANESTHETIST, CERTIFIED REGISTERED

## 2023-12-13 PROCEDURE — D9220A PRA ANESTHESIA: Mod: CRNA,,, | Performed by: NURSE ANESTHETIST, CERTIFIED REGISTERED

## 2023-12-13 PROCEDURE — 45381 COLONOSCOPY SUBMUCOUS NJX: CPT | Performed by: INTERNAL MEDICINE

## 2023-12-13 PROCEDURE — 88305 TISSUE EXAM BY PATHOLOGIST: CPT | Performed by: PATHOLOGY

## 2023-12-13 PROCEDURE — 45380 COLONOSCOPY AND BIOPSY: CPT | Mod: ,,, | Performed by: INTERNAL MEDICINE

## 2023-12-13 PROCEDURE — 25000003 PHARM REV CODE 250: Performed by: NURSE ANESTHETIST, CERTIFIED REGISTERED

## 2023-12-13 PROCEDURE — D9220A PRA ANESTHESIA: ICD-10-PCS | Mod: CRNA,,, | Performed by: NURSE ANESTHETIST, CERTIFIED REGISTERED

## 2023-12-13 PROCEDURE — 88305 TISSUE EXAM BY PATHOLOGIST: CPT | Mod: 26,,, | Performed by: PATHOLOGY

## 2023-12-13 PROCEDURE — 45381 COLONOSCOPY SUBMUCOUS NJX: CPT | Mod: 51,,, | Performed by: INTERNAL MEDICINE

## 2023-12-13 PROCEDURE — 45380 PR COLONOSCOPY,BIOPSY: ICD-10-PCS | Mod: ,,, | Performed by: INTERNAL MEDICINE

## 2023-12-13 PROCEDURE — 25000003 PHARM REV CODE 250: Performed by: INTERNAL MEDICINE

## 2023-12-13 PROCEDURE — D9220A PRA ANESTHESIA: ICD-10-PCS | Mod: ANES,,, | Performed by: ANESTHESIOLOGY

## 2023-12-13 PROCEDURE — 27201012 HC FORCEPS, HOT/COLD, DISP: Performed by: INTERNAL MEDICINE

## 2023-12-13 PROCEDURE — 37000009 HC ANESTHESIA EA ADD 15 MINS: Performed by: INTERNAL MEDICINE

## 2023-12-13 PROCEDURE — 45380 COLONOSCOPY AND BIOPSY: CPT | Performed by: INTERNAL MEDICINE

## 2023-12-13 PROCEDURE — 88305 TISSUE EXAM BY PATHOLOGIST: ICD-10-PCS | Mod: 26,,, | Performed by: PATHOLOGY

## 2023-12-13 PROCEDURE — 37000008 HC ANESTHESIA 1ST 15 MINUTES: Performed by: INTERNAL MEDICINE

## 2023-12-13 PROCEDURE — 27202363 HC INJECTION AGENT, SUBMUCOSAL, ANY: Performed by: INTERNAL MEDICINE

## 2023-12-13 RX ORDER — SODIUM CHLORIDE 9 MG/ML
INJECTION, SOLUTION INTRAVENOUS CONTINUOUS
Status: DISCONTINUED | OUTPATIENT
Start: 2023-12-13 | End: 2023-12-13 | Stop reason: HOSPADM

## 2023-12-13 RX ORDER — LIDOCAINE HYDROCHLORIDE 20 MG/ML
INJECTION INTRAVENOUS
Status: DISCONTINUED | OUTPATIENT
Start: 2023-12-13 | End: 2023-12-13

## 2023-12-13 RX ORDER — PROPOFOL 10 MG/ML
VIAL (ML) INTRAVENOUS CONTINUOUS PRN
Status: DISCONTINUED | OUTPATIENT
Start: 2023-12-13 | End: 2023-12-13

## 2023-12-13 RX ORDER — DEXTROMETHORPHAN/PSEUDOEPHED 2.5-7.5/.8
DROPS ORAL
Status: COMPLETED | OUTPATIENT
Start: 2023-12-13 | End: 2023-12-13

## 2023-12-13 RX ORDER — SODIUM CHLORIDE 0.9 % (FLUSH) 0.9 %
10 SYRINGE (ML) INJECTION ONCE
Status: DISCONTINUED | OUTPATIENT
Start: 2023-12-13 | End: 2023-12-13 | Stop reason: HOSPADM

## 2023-12-13 RX ORDER — PROPOFOL 10 MG/ML
VIAL (ML) INTRAVENOUS
Status: DISCONTINUED | OUTPATIENT
Start: 2023-12-13 | End: 2023-12-13

## 2023-12-13 RX ADMIN — PROPOFOL 160 MCG/KG/MIN: 10 INJECTION, EMULSION INTRAVENOUS at 02:12

## 2023-12-13 RX ADMIN — PROPOFOL 100 MG: 10 INJECTION, EMULSION INTRAVENOUS at 02:12

## 2023-12-13 RX ADMIN — SODIUM CHLORIDE, SODIUM LACTATE, POTASSIUM CHLORIDE, AND CALCIUM CHLORIDE: .6; .31; .03; .02 INJECTION, SOLUTION INTRAVENOUS at 02:12

## 2023-12-13 RX ADMIN — SODIUM CHLORIDE: 9 INJECTION, SOLUTION INTRAVENOUS at 02:12

## 2023-12-13 RX ADMIN — LIDOCAINE HYDROCHLORIDE 100 MG: 20 INJECTION, SOLUTION INTRAVENOUS at 02:12

## 2023-12-13 NOTE — ANESTHESIA PREPROCEDURE EVALUATION
12/13/2023  Danyell Mathias is a 68 y.o., female.  Past Medical History:   Diagnosis Date    Allergy     Arthritis     Encounter for blood transfusion     Gastric ulcer     Hypertension     Migraine headache      Past Surgical History:   Procedure Laterality Date    HYSTERECTOMY      TONSILLECTOMY           Pre-op Assessment       I have reviewed the Medications.     Review of Systems  Anesthesia Hx:  No problems with previous Anesthesia             Denies Family Hx of Anesthesia complications.     Cardiovascular:     Hypertension                                        Hepatic/GI:   PUD,               Neurological:      Headaches                                 Endocrine:   Hypothyroidism              Physical Exam  General: Well nourished    Airway:  Mallampati: II   TM Distance: Normal  Neck ROM: Normal ROM    Dental:  Intact    Chest/Lungs:  Clear to auscultation    Heart:  Rate: Normal  Rhythm: Regular Rhythm        Anesthesia Plan  Type of Anesthesia, risks & benefits discussed:    Anesthesia Type: Gen Natural Airway  Intra-op Monitoring Plan: Standard ASA Monitors  Post Op Pain Control Plan: multimodal analgesia  Informed Consent: Informed consent signed with the Patient and all parties understand the risks and agree with anesthesia plan.  All questions answered.   ASA Score: 3  Day of Surgery Review of History & Physical: H&P Update referred to the surgeon/provider.    Ready For Surgery From Anesthesia Perspective.     .

## 2023-12-13 NOTE — TRANSFER OF CARE
"Anesthesia Transfer of Care Note    Patient: Danyell Mathias    Procedure(s) Performed: Procedure(s) (LRB):  COLONOSCOPY (N/A)    Patient location: GI    Anesthesia Type: general    Transport from OR: Transported from OR on room air with adequate spontaneous ventilation    Post pain: adequate analgesia    Post assessment: no apparent anesthetic complications and tolerated procedure well    Post vital signs: stable    Level of consciousness: awake, alert and oriented    Nausea/Vomiting: no nausea/vomiting    Complications: none    Transfer of care protocol was followed      Last vitals: Visit Vitals  /84 (Patient Position: Lying)   Pulse 93   Temp 37.1 °C (98.8 °F)   Resp 16   Ht 5' 1" (1.549 m)   Wt 104.3 kg (230 lb)   LMP  (LMP Unknown)   SpO2 (!) 94%   Breastfeeding No   BMI 43.46 kg/m²     "

## 2023-12-13 NOTE — PROVATION PATIENT INSTRUCTIONS
Discharge Summary/Instructions after an Endoscopic Procedure  Patient Name: Danyell Mathias  Patient MRN: [id number]  Patient YOB: 1955 Wednesday, December 13, 2023  Aman Hooks MD  Dear patient,  As a result of recent federal legislation (The Federal Cures Act), you may   receive lab or pathology results from your procedure in your [facility]   account before your physician is able to contact you. Your physician or   their representative will relay the results to you with their   recommendations at their soonest availability.  Thank you,  Your health is very important to us during the Covid Crisis. Following your   procedure today, you will receive a daily text for 2 weeks asking about   signs or symptoms of Covid 19.  Please respond to this text when you   receive it so we can follow up and keep you as safe as possible.   RESTRICTIONS:  During your procedure today, you received medications for sedation.  These   medications may affect your judgment, balance and coordination.  Therefore,   for 24 hours, you have the following restrictions:   - DO NOT drive a car, operate machinery, make legal/financial decisions,   sign important papers or drink alcohol.    ACTIVITY:  Today: no heavy lifting, straining or running due to procedural   sedation/anesthesia.  The following day: return to full activity including work.  DIET:  Eat and drink normally unless instructed otherwise.     TREATMENT FOR COMMON SIDE EFFECTS:  - Mild abdominal pain, nausea, belching, bloating or excessive gas:  rest,   eat lightly and use a heating pad.  - Sore Throat: treat with throat lozenges and/or gargle with warm salt   water.  - Because air was used during the procedure, expelling large amounts of air   from your rectum or belching is normal.  - If a bowel prep was taken, you may not have a bowel movement for 1-3 days.    This is normal.  SYMPTOMS TO WATCH FOR AND REPORT TO YOUR PHYSICIAN:  1. Abdominal pain or bloating, other  than gas cramps.  2. Chest pain.  3. Back pain.  4. Signs of infection such as: chills or fever occurring within 24 hours   after the procedure.  5. Rectal bleeding, which would show as bright red, maroon, or black stools.   (A tablespoon of blood from the rectum is not serious, especially if   hemorrhoids are present.)  6. Vomiting.  7. Weakness or dizziness.  GO DIRECTLY TO THE NEAREST EMERGENCY ROOM IF YOU HAVE ANY OF THE FOLLOWING:      Difficulty breathing              Chills and/or fever over 101 F   Persistent vomiting and/or vomiting blood   Severe abdominal pain   Severe chest pain   Black, tarry stools   Bleeding- more than one tablespoon   Any other symptom or condition that you feel may need urgent attention  Your doctor recommends these additional instructions:  If any biopsies were taken, your doctors clinic will contact you in 1 to 2   weeks with any results.  - Discharge patient to home.   - Patient has a contact number available for emergencies.  The signs and   symptoms of potential delayed complications were discussed with the   patient.  Return to normal activities tomorrow.  Written discharge   instructions were provided to the patient.   - Resume previous diet.   - Continue present medications.   - Await pathology results.   - Repeat colonoscopy in 5 years for surveillance.   - Obtain CT scan with IV and oral contrast to eval for possible fistula.   Might need referral to colorectal surgeon  For questions, problems or results please call your physician - Aman Hooks MD.  EMERGENCY PHONE NUMBER: [phone],  LAB RESULTS: [phone]  IF A COMPLICATION OR EMERGENCY SITUATION ARISES AND YOU ARE UNABLE TO REACH   YOUR PHYSICIAN - GO DIRECTLY TO THE EMERGENCY ROOM.  Aman Hooks MD  12/13/2023 3:18:34 PM  This report has been verified and signed electronically.  Dear patient,  As a result of recent federal legislation (The Federal Cures Act), you may   receive lab or pathology  results from your procedure in your [facility]   account before your physician is able to contact you. Your physician or   their representative will relay the results to you with their   recommendations at their soonest availability.  Thank you,  PROVATION

## 2023-12-14 ENCOUNTER — HOSPITAL ENCOUNTER (OUTPATIENT)
Dept: RADIOLOGY | Facility: HOSPITAL | Age: 68
Discharge: HOME | End: 2023-12-14
Attending: INTERNAL MEDICINE
Payer: MEDICARE

## 2023-12-14 DIAGNOSIS — R10.9 ABDOMINAL PAIN, UNSPECIFIED ABDOMINAL LOCATION: ICD-10-CM

## 2023-12-14 PROCEDURE — 74177 CT ABDOMEN PELVIS WITH IV CONTRAST: ICD-10-PCS | Mod: 26,,, | Performed by: RADIOLOGY

## 2023-12-14 PROCEDURE — 25500020 PHARM REV CODE 255: Performed by: INTERNAL MEDICINE

## 2023-12-14 PROCEDURE — 74177 CT ABD & PELVIS W/CONTRAST: CPT | Mod: TC

## 2023-12-14 PROCEDURE — A9698 NON-RAD CONTRAST MATERIALNOC: HCPCS | Performed by: INTERNAL MEDICINE

## 2023-12-14 PROCEDURE — 74177 CT ABD & PELVIS W/CONTRAST: CPT | Mod: 26,,, | Performed by: RADIOLOGY

## 2023-12-14 RX ADMIN — IOHEXOL 100 ML: 350 INJECTION, SOLUTION INTRAVENOUS at 01:12

## 2023-12-14 RX ADMIN — IOHEXOL 1000 ML: 12 SOLUTION ORAL at 12:12

## 2023-12-15 ENCOUNTER — TELEPHONE (OUTPATIENT)
Dept: SURGERY | Facility: CLINIC | Age: 68
End: 2023-12-15
Payer: MEDICARE

## 2023-12-15 NOTE — TELEPHONE ENCOUNTER
Left voicemail with callback number regarding setting up appointment per referral. Instructed to call back.

## 2023-12-15 NOTE — TELEPHONE ENCOUNTER
----- Message from Sylvie Gregorio sent at 12/15/2023  9:25 AM CST -----  Regarding: Missed Call  Contact: [phone]  Returning a Missed Call         Caller:  DALILA ALONSO [[id number]]           Returning call to: Elvia           Caller can be reached @: [phone]         Nature of the call:  Missed Call

## 2023-12-15 NOTE — ANESTHESIA POSTPROCEDURE EVALUATION
Anesthesia Post Evaluation    Patient: Danyell Mathias    Procedure(s) Performed: Procedure(s) (LRB):  COLONOSCOPY (N/A)    Final Anesthesia Type: general      Patient location during evaluation: GI PACU  Patient participation: Yes- Able to Participate  Level of consciousness: awake and alert  Post-procedure vital signs: reviewed and stable  Pain management: adequate  Airway patency: patent    PONV status at discharge: No PONV  Anesthetic complications: no      Cardiovascular status: blood pressure returned to baseline  Respiratory status: unassisted  Hydration status: euvolemic  Follow-up not needed.              Vitals Value Taken Time   /65 12/13/23 1548   Temp 36.8 °C (98.2 °F) 12/13/23 1522   Pulse 80 12/13/23 1548   Resp 20 12/13/23 1548   SpO2 98 % 12/13/23 1548         Event Time   Out of Recovery 16:16:19         Pain/Miguelina Score: No data recorded

## 2023-12-15 NOTE — TELEPHONE ENCOUNTER
Spoke with patient and appointment scheduled per referral. Details confirmed verbally over phone and viewable in portal.

## 2023-12-15 NOTE — TELEPHONE ENCOUNTER
----- Message from DAI Benitez MD sent at 12/15/2023  6:50 AM CST -----  Regarding: RE: interesting case, referral>?  That is wild!  I would be happy to see her.  I don't think an ERCP is going to add much.  I would likely plan to offer her right colectomy and cholecystectomy.      Elvia: can we get her a clinic appt?    Thank you!    Dominick    ----- Message -----  From: Aman Hooks MD  Sent: 12/14/2023   9:39 PM CST  To: DAI Benitez MD  Subject: interesting case, referral>?                     Dominick  Scoped this lady couple days ago and got a CT because of the findings..  She seemed to have a fistula around the hepatic flexure on colon, and the tissue surroudning was very odd appearing, biopsied (pending).  CT showing ( I also reviewed with radiologist) that seems to show a cholecyst-colonic? Fistula with pneumobilia...  Never seem this.   Is this something you can see? Should I send to gen surg? And do you think an ERCP is warranted?   Thanks for your thoughts.    LIV    Hope you and your family are doing well and hope yall have a great [place].

## 2023-12-18 LAB
FINAL PATHOLOGIC DIAGNOSIS: NORMAL
GROSS: NORMAL
Lab: NORMAL

## 2023-12-22 ENCOUNTER — OFFICE VISIT (OUTPATIENT)
Dept: SURGERY | Facility: CLINIC | Age: 68
End: 2023-12-22
Payer: MEDICARE

## 2023-12-22 ENCOUNTER — TELEPHONE (OUTPATIENT)
Dept: SURGERY | Facility: CLINIC | Age: 68
End: 2023-12-22
Payer: MEDICARE

## 2023-12-22 VITALS
WEIGHT: 233.69 LBS | SYSTOLIC BLOOD PRESSURE: 107 MMHG | DIASTOLIC BLOOD PRESSURE: 59 MMHG | BODY MASS INDEX: 44.12 KG/M2 | HEART RATE: 78 BPM | HEIGHT: 61 IN

## 2023-12-22 DIAGNOSIS — K63.2 COLONIC FISTULA: Primary | ICD-10-CM

## 2023-12-22 PROCEDURE — 1101F PR PT FALLS ASSESS DOC 0-1 FALLS W/OUT INJ PAST YR: ICD-10-PCS | Mod: CPTII,S$GLB,, | Performed by: COLON & RECTAL SURGERY

## 2023-12-22 PROCEDURE — 1159F PR MEDICATION LIST DOCUMENTED IN MEDICAL RECORD: ICD-10-PCS | Mod: CPTII,S$GLB,, | Performed by: COLON & RECTAL SURGERY

## 2023-12-22 PROCEDURE — 1126F PR PAIN SEVERITY QUANTIFIED, NO PAIN PRESENT: ICD-10-PCS | Mod: CPTII,S$GLB,, | Performed by: COLON & RECTAL SURGERY

## 2023-12-22 PROCEDURE — 1159F MED LIST DOCD IN RCRD: CPT | Mod: CPTII,S$GLB,, | Performed by: COLON & RECTAL SURGERY

## 2023-12-22 PROCEDURE — 99204 PR OFFICE/OUTPT VISIT, NEW, LEVL IV, 45-59 MIN: ICD-10-PCS | Mod: S$GLB,,, | Performed by: COLON & RECTAL SURGERY

## 2023-12-22 PROCEDURE — 99999 PR PBB SHADOW E&M-EST. PATIENT-LVL IV: ICD-10-PCS | Mod: PBBFAC,,, | Performed by: COLON & RECTAL SURGERY

## 2023-12-22 PROCEDURE — 1160F RVW MEDS BY RX/DR IN RCRD: CPT | Mod: CPTII,S$GLB,, | Performed by: COLON & RECTAL SURGERY

## 2023-12-22 PROCEDURE — 3074F PR MOST RECENT SYSTOLIC BLOOD PRESSURE < 130 MM HG: ICD-10-PCS | Mod: CPTII,S$GLB,, | Performed by: COLON & RECTAL SURGERY

## 2023-12-22 PROCEDURE — 1160F PR REVIEW ALL MEDS BY PRESCRIBER/CLIN PHARMACIST DOCUMENTED: ICD-10-PCS | Mod: CPTII,S$GLB,, | Performed by: COLON & RECTAL SURGERY

## 2023-12-22 PROCEDURE — 3074F SYST BP LT 130 MM HG: CPT | Mod: CPTII,S$GLB,, | Performed by: COLON & RECTAL SURGERY

## 2023-12-22 PROCEDURE — 3008F BODY MASS INDEX DOCD: CPT | Mod: CPTII,S$GLB,, | Performed by: COLON & RECTAL SURGERY

## 2023-12-22 PROCEDURE — 3288F FALL RISK ASSESSMENT DOCD: CPT | Mod: CPTII,S$GLB,, | Performed by: COLON & RECTAL SURGERY

## 2023-12-22 PROCEDURE — 99204 OFFICE O/P NEW MOD 45 MIN: CPT | Mod: S$GLB,,, | Performed by: COLON & RECTAL SURGERY

## 2023-12-22 PROCEDURE — 99999 PR PBB SHADOW E&M-EST. PATIENT-LVL IV: CPT | Mod: PBBFAC,,, | Performed by: COLON & RECTAL SURGERY

## 2023-12-22 PROCEDURE — 1101F PT FALLS ASSESS-DOCD LE1/YR: CPT | Mod: CPTII,S$GLB,, | Performed by: COLON & RECTAL SURGERY

## 2023-12-22 PROCEDURE — 1126F AMNT PAIN NOTED NONE PRSNT: CPT | Mod: CPTII,S$GLB,, | Performed by: COLON & RECTAL SURGERY

## 2023-12-22 PROCEDURE — 3008F PR BODY MASS INDEX (BMI) DOCUMENTED: ICD-10-PCS | Mod: CPTII,S$GLB,, | Performed by: COLON & RECTAL SURGERY

## 2023-12-22 PROCEDURE — 3078F PR MOST RECENT DIASTOLIC BLOOD PRESSURE < 80 MM HG: ICD-10-PCS | Mod: CPTII,S$GLB,, | Performed by: COLON & RECTAL SURGERY

## 2023-12-22 PROCEDURE — 3078F DIAST BP <80 MM HG: CPT | Mod: CPTII,S$GLB,, | Performed by: COLON & RECTAL SURGERY

## 2023-12-22 PROCEDURE — 3288F PR FALLS RISK ASSESSMENT DOCUMENTED: ICD-10-PCS | Mod: CPTII,S$GLB,, | Performed by: COLON & RECTAL SURGERY

## 2023-12-22 RX ORDER — CIPROFLOXACIN 500 MG/1
500 TABLET ORAL 2 TIMES DAILY
Qty: 2 TABLET | Refills: 0 | Status: SHIPPED | OUTPATIENT
Start: 2023-12-22 | End: 2023-12-23

## 2023-12-22 RX ORDER — METRONIDAZOLE 500 MG/1
500 TABLET ORAL 2 TIMES DAILY
Qty: 2 TABLET | Refills: 0 | Status: SHIPPED | OUTPATIENT
Start: 2023-12-22 | End: 2023-12-23

## 2023-12-22 NOTE — PROGRESS NOTES
CRS Office Visit History and Physical    Referring Md:   Aman Hooks Md  [address]  [address]  [place] [address]    SUBJECTIVE:     Chief Complaint: cholecystic-colonic fistula.    History of Present Illness:  The patient is a new patient to this practice.   Course is as follows:  Patient is a 68 y.o. female presents with cholecysto-colonic fistula. She reports a remote history of gallstones and a provider recommending her having her gallbladder out in the past, but she never sought treatment. She denies recurrent cholecystitis or bouts of biliary colic. She has no right upper quadrant abdominal pain. Previous hysterectomy through pfannenstiel incision.  No other abdominal surgeries.  Functionally, she is active and healthy.  Nondiabetic.  No heart disease.  She is daily bowel movements.  No recent change in her weight.  No issues with fecal incontinence.  Does have a significant family history of colon cancer in her father.    Colonoscopy done on 12/13/23 for + FIT test.  Found to have a colonic fistula.   CT abd pelvis: 12/14/23:  demonstrates a diminutive gallbladder with a fistula tract within the hepatic flexure of the colon; there is pneumobilia throughout the biliary ductal system    Last Colonoscopy: 12/13/2023:   Impression:            - Non-bleeding internal hemorrhoids.                          - Suspected Colonic fistula with surrounding                          irregular tissue, biopsied and tattoo placed just                          distal..                          - Diverticulosis in the sigmoid colon and in the                          descending colon.                          - The examination was otherwise normal on direct                          and retroflexion views.                          There is what appears to be pinhole opening with                          irregular mucosa, i suspect at the hepatic                          flexure, tattooed just distal, biopsied.      Review of patient's allergies indicates:   Allergen Reactions    Latex, natural rubber Other (See Comments)     Blisters      Macrolide antibiotics Nausea And Vomiting     Erythromycin      Penicillins Itching    Sulfa (sulfonamide antibiotics) Itching       Past Medical History:   Diagnosis Date    Allergy     Arthritis     Encounter for blood transfusion     Gastric ulcer     Hypertension     Migraine headache      Past Surgical History:   Procedure Laterality Date    COLONOSCOPY N/A 12/13/2023    Procedure: COLONOSCOPY;  Surgeon: Aman Hooks MD;  Location: [facility];  Service: Endoscopy;  Laterality: N/A;    HYSTERECTOMY      TONSILLECTOMY       Family History   Problem Relation Age of Onset    Cancer Mother     Rheum arthritis Mother     Heart disease Mother     Cancer Father 74        colon    Heart failure Father     Stroke Father     Alzheimer's disease Father     No Known Problems Sister     No Known Problems Daughter     No Known Problems Son      Social History     Tobacco Use    Smoking status: Never    Smokeless tobacco: Never   Substance Use Topics    Alcohol use: No     Alcohol/week: 0.0 standard drinks of alcohol    Drug use: No        Review of Systems:  Review of Systems   Constitutional:  Negative for chills, diaphoresis, fever, malaise/fatigue and weight loss.   HENT:  Negative for congestion.    Respiratory:  Negative for shortness of breath.    Cardiovascular:  Negative for chest pain and leg swelling.   Gastrointestinal:  Negative for abdominal pain, blood in stool, constipation, nausea and vomiting.   Genitourinary:  Negative for dysuria.   Musculoskeletal:  Negative for back pain and myalgias.   Skin:  Negative for rash.   Neurological:  Negative for dizziness and weakness.   Endo/Heme/Allergies:  Does not bruise/bleed easily.   Psychiatric/Behavioral:  Negative for depression.        OBJECTIVE:     Vital Signs (Most Recent)  BP (!) 107/59 (BP Location: Left arm, Patient Position:  "Sitting, BP Method: Large (Automatic))   Pulse 78   Ht 5' 1" (1.549 m)   Wt 106 kg (233 lb 11 oz)   LMP  (LMP Unknown)   BMI 44.15 kg/m²     Physical Exam:  General: White female in no distress   Neuro: alert and oriented x 4.  Moves all extremities.     HEENT: no icterus.  Trachea midline  Respiratory: respirations are even and unlabored  Cardiac: regular rate  Abdomen: soft, non-distended, non-tender to palpation  Extremities: Warm dry and intact  Skin: no rashes  Anorectal: deferred    Labs: H&H 13 and 39.  Creatinine 1.2.      Imaging:   CT abd pelvis 12/14/23: reviewed  - Mostly decompressed gallbladder and wall thickening with area of the gallbladder fundus closely approximating portion of the hepatic flexure.  No other secondary inflammatory changes otherwise noted.  Given accompanying pneumobilia and reported pin-hole opening at the hepatic flexure by colonoscopy, a cholecysto-colonic fistula is certainly a consideration.  Recommend further correlation with ERCP.    - Hepatic steatosis and additional findings as above.    - Left lower lobe 0.6 cm pulmonary nodule, technically indeterminate.  Follow-up CT in 6-12 months can be obtained to ensure stability.      ASSESSMENT/PLAN:     Diagnoses and all orders for this visit:    Colonic fistula  -     Case Request Operating Room: COLECTOMY, RIGHT, LAPAROSCOPIC, ERAS low        68 y.o. female with cholecysto-colonic fistula. We explained that there can be two approaches to this: conservative measures versus surgical intervention. Given the colon's abnormal connection to the biliary system, she is at risk for development of cholangitis.  Alternatively, resection was offered.  This could be performed laparoscopic with possible conversion to open if there was significant amount of adhesions and scar tissue in the right upper quadrant.  It is likely that her gallbladder is densely adherent to the hepatic flexure.  Will plan for laparoscopic mobilization of the " right colon followed by a right upper quadrant extraction to be overlying the gallbladder.  Ideally, we will be able to dissect out the cystic duct laparoscopically for better visualization.  We discussed the risks of the surgery to include need for open surgery, pain, 3-4 day hospital stay, anastomotic leak from the colon anastomosis, biliary leak from the gallbladder work, 4-6 week total recovery.  She wished to proceed with resection.  Message was sent to the Surgical Oncology team for assistance with the gallbladder.  The surgery is planned for 1/24/23.     DAI Benitez MD, FACS, FASCRS  Staff Surgeon  Colon & Rectal Surgery

## 2023-12-26 ENCOUNTER — TELEPHONE (OUTPATIENT)
Dept: SURGERY | Facility: CLINIC | Age: 68
End: 2023-12-26
Payer: MEDICARE

## 2023-12-26 NOTE — TELEPHONE ENCOUNTER
Attempted to contact pt in regards to scheduling consult appt with Dr. Watkins. No answer. LM with direct callback number to return call.

## 2023-12-27 ENCOUNTER — TELEPHONE (OUTPATIENT)
Dept: SURGERY | Facility: CLINIC | Age: 68
End: 2023-12-27
Payer: MEDICARE

## 2023-12-27 NOTE — TELEPHONE ENCOUNTER
Called pt and left her a vmail to call our offices back re: her coverage and her sx on 1/24. Message sent to Blessing Freitas as well.

## 2023-12-28 ENCOUNTER — TELEPHONE (OUTPATIENT)
Dept: SURGERY | Facility: CLINIC | Age: 68
End: 2023-12-28
Payer: MEDICARE

## 2024-01-16 ENCOUNTER — OFFICE VISIT (OUTPATIENT)
Dept: SURGERY | Facility: CLINIC | Age: 69
End: 2024-01-16
Payer: MEDICARE

## 2024-01-16 ENCOUNTER — TELEPHONE (OUTPATIENT)
Dept: SURGERY | Facility: CLINIC | Age: 69
End: 2024-01-16
Payer: MEDICARE

## 2024-01-16 VITALS
WEIGHT: 237.13 LBS | BODY MASS INDEX: 44.77 KG/M2 | DIASTOLIC BLOOD PRESSURE: 75 MMHG | SYSTOLIC BLOOD PRESSURE: 156 MMHG | HEIGHT: 61 IN | HEART RATE: 68 BPM | OXYGEN SATURATION: 95 %

## 2024-01-16 DIAGNOSIS — K83.3: Primary | ICD-10-CM

## 2024-01-16 PROCEDURE — 99205 OFFICE O/P NEW HI 60 MIN: CPT | Mod: S$GLB,,, | Performed by: SURGERY

## 2024-01-16 PROCEDURE — 99999 PR PBB SHADOW E&M-EST. PATIENT-LVL III: CPT | Mod: PBBFAC,,, | Performed by: SURGERY

## 2024-01-16 NOTE — PROGRESS NOTES
Encounter Date:  2024    Patient ID: Danyell Mathias  Age:  68 y.o. :  1955    Chief Complaint:  cholecystic colonic fistula       History:    Ms. Mathias is a 68 y.o. female who presents with cholecystic colonic fistula. The patient reports a history of gallstones over 30 years ago but did not get any treatment or surgery for it. She denies any recurrent cholecystitis or episodes of biliary colic. No nausea, vomiting, diarrhea constipation. She does report increasing RUQ fullness and discomfort, abdominal distention the past 6-8 months. She had a previous hysterectomy decades ago through pfannenstiel incision, no other abdominal surgeries. She is a nonsmoker, not taking any blood thinners. Colonoscopy done on 23 for a positive FIT test showed a colonic fistula. Last colonoscopy was 8 years prior which was normal. CT abdomen pelvis scan 23 showed a diminutive gallbladder with a fistula tract within the hepatic flexure of the colon, there is pneumobilia throughout the biliary ductal system. She was seen by colorectal surgery 23 which they scheduled a laparoscopic right colectomy on 24, they requested surgical oncology to assist with the extraction of the gallbladder.         Past Medical History:   Diagnosis Date    Allergy     Arthritis     Encounter for blood transfusion     Gastric ulcer     Hypertension     Migraine headache      Past Surgical History:   Procedure Laterality Date    COLONOSCOPY N/A 2023    Procedure: COLONOSCOPY;  Surgeon: Aman Hooks MD;  Location: [facility];  Service: Endoscopy;  Laterality: N/A;    HYSTERECTOMY      TONSILLECTOMY       Current Outpatient Medications on File Prior to Visit   Medication Sig Dispense Refill    ascorbic acid, vitamin C, (VITAMIN C) 1000 MG tablet Take 1,000 mg by mouth once daily.      B6-folic-B12-coffee-phosphatid 1.7 mg-400 mcg- 2.4 mcg Cap Take by mouth.      diclofenac (VOLTAREN) 50 MG EC tablet TAKE 1  TABLET TWICE DAILY (Patient taking differently: 100 mg. Once every 3 days) 180 tablet 0    fenofibrate (TRICOR) 145 MG tablet Take 1 tablet by mouth once daily. 30 tablet 3    levothyroxine (SYNTHROID) 75 MCG tablet Take 75 mcg by mouth.      losartan-hydrochlorothiazide 100-25 mg (HYZAAR) 100-25 mg per tablet Take 1 tablet by mouth once daily.      multivitamin capsule Take 1 capsule by mouth once daily.      omeprazole (PRILOSEC) 40 MG capsule Take 1 capsule (40 mg total) by mouth once daily. 90 capsule 3    topiramate (TOPAMAX) 50 MG tablet Take 1 tablet (50 mg total) by mouth 2 (two) times daily. 90 tablet 3    vitamin E 400 UNIT capsule Take 400 Units by mouth once daily.      lisinopril-hydrochlorothiazide (PRINZIDE,ZESTORETIC) 10-12.5 mg per tablet Take 1 tablet by mouth once daily. (Patient not taking: Reported on 1/16/2024) 90 tablet 3    sumatriptan (IMITREX) 50 MG tablet Take by mouth as needed.      tramadol (ULTRAM) 50 mg tablet TAKE ONE TABLET BY MOUTH THREE TIMES DAILY AS NEEDED (Patient not taking: Reported on 1/16/2024) 90 tablet 3     Current Facility-Administered Medications on File Prior to Visit   Medication Dose Route Frequency Provider Last Rate Last Admin    nitroGLYCERIN SL tablet 0.4 mg  0.4 mg Sublingual 1 time in Clinic/HOD Tomer Gonzalez MD         Review of patient's allergies indicates:   Allergen Reactions    Latex, natural rubber Other (See Comments)     Blisters      Macrolide antibiotics Nausea And Vomiting     Erythromycin      Penicillins Itching    Sulfa (sulfonamide antibiotics) Itching       Family History:  Her family history includes Alzheimer's disease in her father; Cancer in her mother; Cancer (age of onset: 74) in her father; Heart disease in her mother; Heart failure in her father; No Known Problems in her daughter, sister, and son; Rheum arthritis in her mother; Stroke in her father.     Social History:  She reports that she has never smoked. She has never used  "smokeless tobacco. She reports that she does not drink alcohol and does not use drugs.     ROS:     Review of Systems  Pertinent positive/negatives detailed in HPI, all other systems negative.     Physical Exam:  BP (!) 156/75   Pulse 68   Ht 5' 1" (1.549 m)   Wt 107.6 kg (237 lb 1.7 oz)   LMP  (LMP Unknown)   SpO2 95%   BMI 44.80 kg/m²     Physical Exam  Constitutional:       Appearance: Normal appearance.   HENT:      Head: Normocephalic and atraumatic.      Mouth/Throat:      Mouth: Mucous membranes are moist.   Cardiovascular:      Rate and Rhythm: Regular rhythm.      Heart sounds: Normal heart sounds.   Pulmonary:      Effort: Pulmonary effort is normal. No respiratory distress.   Abdominal:      General: There is distension.      Palpations: Abdomen is soft.      Tenderness: There is no abdominal tenderness. There is no guarding.   Skin:     General: Skin is warm.   Neurological:      General: No focal deficit present.      Mental Status: She is alert and oriented to person, place, and time. Mental status is at baseline.   Psychiatric:         Mood and Affect: Mood normal.         Behavior: Behavior normal.         Thought Content: Thought content normal.         Assessment and Plan:     Ms. Mathias is a 68 y.o. female who presents with cholecystic colonic fistula    Plan:   - Scheduled laparoscopic right colectomy 1/24/24  - We will assist with the gallbladder portion of the case   - All questions and concerns answered   - Follow up with primary team      Niall Jasmine MD  [facility]         I have seen the patient, reviewed the attached resident or BARBRA's history and physical, assessment and plan. I have personally interviewed and examined the patient at bedside, reviewed the chart, relevant imaging/labs and agree with the findings.      Chronic cholecysto-colonic fistula with some RUQ soreness/bloating, no overt cholangitis.  Suspect sequelae of stones over malignancy.  Given colonic " rather than small bowel connection, vague symptoms, rec resection.     Plan for cholecystectomy / colon resection with Dr. Benitez.  Risks and benefits including death, bleeding, infection, scar, pain/numbness, margin positivity, discovery of additional disease, bile leakage, damage to local structures, need for conversion to open procedure, need for additional procedures based on operative findings and imponderables were all reviewed.  She was given the opportunity to ask questions, which were all addressed.  She voiced understanding and wishes to proceed.    Fernando Watkins MD, FACS  Surgical Oncology  [facility] [place], [place]  Office: [phone]  Fax: [phone]

## 2024-01-16 NOTE — H&P (VIEW-ONLY)
Encounter Date:  2024    Patient ID: Danyell Mathias  Age:  68 y.o. :  1955    Chief Complaint:  cholecystic colonic fistula       History:    Ms. Mathias is a 68 y.o. female who presents with cholecystic colonic fistula. The patient reports a history of gallstones over 30 years ago but did not get any treatment or surgery for it. She denies any recurrent cholecystitis or episodes of biliary colic. No nausea, vomiting, diarrhea constipation. She does report increasing RUQ fullness and discomfort, abdominal distention the past 6-8 months. She had a previous hysterectomy decades ago through pfannenstiel incision, no other abdominal surgeries. She is a nonsmoker, not taking any blood thinners. Colonoscopy done on 23 for a positive FIT test showed a colonic fistula. Last colonoscopy was 8 years prior which was normal. CT abdomen pelvis scan 23 showed a diminutive gallbladder with a fistula tract within the hepatic flexure of the colon, there is pneumobilia throughout the biliary ductal system. She was seen by colorectal surgery 23 which they scheduled a laparoscopic right colectomy on 24, they requested surgical oncology to assist with the extraction of the gallbladder.         Past Medical History:   Diagnosis Date    Allergy     Arthritis     Encounter for blood transfusion     Gastric ulcer     Hypertension     Migraine headache      Past Surgical History:   Procedure Laterality Date    COLONOSCOPY N/A 2023    Procedure: COLONOSCOPY;  Surgeon: Aman Hooks MD;  Location: [facility];  Service: Endoscopy;  Laterality: N/A;    HYSTERECTOMY      TONSILLECTOMY       Current Outpatient Medications on File Prior to Visit   Medication Sig Dispense Refill    ascorbic acid, vitamin C, (VITAMIN C) 1000 MG tablet Take 1,000 mg by mouth once daily.      B6-folic-B12-coffee-phosphatid 1.7 mg-400 mcg- 2.4 mcg Cap Take by mouth.      diclofenac (VOLTAREN) 50 MG EC tablet TAKE 1  TABLET TWICE DAILY (Patient taking differently: 100 mg. Once every 3 days) 180 tablet 0    fenofibrate (TRICOR) 145 MG tablet Take 1 tablet by mouth once daily. 30 tablet 3    levothyroxine (SYNTHROID) 75 MCG tablet Take 75 mcg by mouth.      losartan-hydrochlorothiazide 100-25 mg (HYZAAR) 100-25 mg per tablet Take 1 tablet by mouth once daily.      multivitamin capsule Take 1 capsule by mouth once daily.      omeprazole (PRILOSEC) 40 MG capsule Take 1 capsule (40 mg total) by mouth once daily. 90 capsule 3    topiramate (TOPAMAX) 50 MG tablet Take 1 tablet (50 mg total) by mouth 2 (two) times daily. 90 tablet 3    vitamin E 400 UNIT capsule Take 400 Units by mouth once daily.      lisinopril-hydrochlorothiazide (PRINZIDE,ZESTORETIC) 10-12.5 mg per tablet Take 1 tablet by mouth once daily. (Patient not taking: Reported on 1/16/2024) 90 tablet 3    sumatriptan (IMITREX) 50 MG tablet Take by mouth as needed.      tramadol (ULTRAM) 50 mg tablet TAKE ONE TABLET BY MOUTH THREE TIMES DAILY AS NEEDED (Patient not taking: Reported on 1/16/2024) 90 tablet 3     Current Facility-Administered Medications on File Prior to Visit   Medication Dose Route Frequency Provider Last Rate Last Admin    nitroGLYCERIN SL tablet 0.4 mg  0.4 mg Sublingual 1 time in Clinic/HOD Tomer Gonzalez MD         Review of patient's allergies indicates:   Allergen Reactions    Latex, natural rubber Other (See Comments)     Blisters      Macrolide antibiotics Nausea And Vomiting     Erythromycin      Penicillins Itching    Sulfa (sulfonamide antibiotics) Itching       Family History:  Her family history includes Alzheimer's disease in her father; Cancer in her mother; Cancer (age of onset: 74) in her father; Heart disease in her mother; Heart failure in her father; No Known Problems in her daughter, sister, and son; Rheum arthritis in her mother; Stroke in her father.     Social History:  She reports that she has never smoked. She has never used  "smokeless tobacco. She reports that she does not drink alcohol and does not use drugs.     ROS:     Review of Systems  Pertinent positive/negatives detailed in HPI, all other systems negative.     Physical Exam:  BP (!) 156/75   Pulse 68   Ht 5' 1" (1.549 m)   Wt 107.6 kg (237 lb 1.7 oz)   LMP  (LMP Unknown)   SpO2 95%   BMI 44.80 kg/m²     Physical Exam  Constitutional:       Appearance: Normal appearance.   HENT:      Head: Normocephalic and atraumatic.      Mouth/Throat:      Mouth: Mucous membranes are moist.   Cardiovascular:      Rate and Rhythm: Regular rhythm.      Heart sounds: Normal heart sounds.   Pulmonary:      Effort: Pulmonary effort is normal. No respiratory distress.   Abdominal:      General: There is distension.      Palpations: Abdomen is soft.      Tenderness: There is no abdominal tenderness. There is no guarding.   Skin:     General: Skin is warm.   Neurological:      General: No focal deficit present.      Mental Status: She is alert and oriented to person, place, and time. Mental status is at baseline.   Psychiatric:         Mood and Affect: Mood normal.         Behavior: Behavior normal.         Thought Content: Thought content normal.         Assessment and Plan:     Ms. Mathias is a 68 y.o. female who presents with cholecystic colonic fistula    Plan:   - Scheduled laparoscopic right colectomy 1/24/24  - We will assist with the gallbladder portion of the case   - All questions and concerns answered   - Follow up with primary team      Niall Jasmine MD  [facility]         I have seen the patient, reviewed the attached resident or BARBRA's history and physical, assessment and plan. I have personally interviewed and examined the patient at bedside, reviewed the chart, relevant imaging/labs and agree with the findings.      Chronic cholecysto-colonic fistula with some RUQ soreness/bloating, no overt cholangitis.  Suspect sequelae of stones over malignancy.  Given colonic " rather than small bowel connection, vague symptoms, rec resection.     Plan for cholecystectomy / colon resection with Dr. Benitez.  Risks and benefits including death, bleeding, infection, scar, pain/numbness, margin positivity, discovery of additional disease, bile leakage, damage to local structures, need for conversion to open procedure, need for additional procedures based on operative findings and imponderables were all reviewed.  She was given the opportunity to ask questions, which were all addressed.  She voiced understanding and wishes to proceed.    Fernando Watkins MD, FACS  Surgical Oncology  [facility] [place], [place]  Office: [phone]  Fax: [phone]

## 2024-01-17 PROBLEM — K83.3: Status: ACTIVE | Noted: 2024-01-17

## 2024-01-22 ENCOUNTER — ANESTHESIA EVENT (OUTPATIENT)
Dept: SURGERY | Facility: HOSPITAL | Age: 69
DRG: 330 | End: 2024-01-22
Payer: MEDICARE

## 2024-01-22 RX ORDER — CIPROFLOXACIN 500 MG/1
500 TABLET ORAL 2 TIMES DAILY
Status: ON HOLD | COMMUNITY
End: 2024-01-26 | Stop reason: HOSPADM

## 2024-01-22 RX ORDER — METRONIDAZOLE 500 MG/1
500 TABLET ORAL EVERY 12 HOURS
Status: ON HOLD | COMMUNITY
End: 2024-01-26 | Stop reason: HOSPADM

## 2024-01-22 NOTE — PRE-PROCEDURE INSTRUCTIONS
PreOp Instructions given:   - Verbal medication information (what to hold and what to take)   - NPO guidelines given/CRS Dept  - Arrival place directions given; time to be given the day before procedure by the   Surgeon's Office DOS - 2530  - Bathing with antibacterial soap   - Don't wear any jewelry or bring any valuables AM of surgery   - No makeup or moisturizer to face   - No perfume/cologne, powder, lotions or aftershave   Pt. verbalized understanding.   Pt H/O PONV

## 2024-01-22 NOTE — ANESTHESIA PREPROCEDURE EVALUATION
[facility]  Anesthesia Pre-Operative Evaluation         Patient Name: Danyell Mathias  YOB: 1955  MRN: [id number]    SUBJECTIVE:     Pre-operative evaluation for Procedure(s) (LRB):  COLECTOMY, RIGHT, LAPAROSCOPIC, ERAS low (N/A)  CHOLECYSTECTOMY, LAPAROSCOPIC (N/A)     01/23/2024    Danyell Mathias is a 68 y.o. female w/ a significant PMHx of PONV with every anesthetic, OA, HTN, GERD, recent SOB. Now with  cholecysto-colonic fistula.    Patient now presents for the above procedure(s).    Stress TTE 11/2022  The stress echo portion of this study is negative for myocardial ischemia.  The ECG portion of this study is negative for myocardial ischemia.  The test was stopped because the patient experienced fatigue.  The patient's exercise capacity was average.  During stress, the following significant arrhythmias were observed: occasional PVCs.  The left ventricle is normal in size with normal systolic function. The estimated ejection fraction is 65%.  Normal left ventricular diastolic function.  Normal right ventricular size with normal right ventricular systolic function.  Mild tricuspid regurgitation.  The estimated PA systolic pressure is 24 mmHg.  Normal central venous pressure (3 mmHg).       Prev airway: None documented.        Patient Active Problem List   Diagnosis    Migraines    Elevated TSH    Arthritis pain    Peptic ulcer disease    HTN (hypertension)    Subclinical hypothyroidism    Biliary tract fistula       Review of patient's allergies indicates:   Allergen Reactions    Latex, natural rubber Other (See Comments)     Blisters      Macrolide antibiotics Nausea And Vomiting     Erythromycin      Penicillins Itching    Sulfa (sulfonamide antibiotics) Itching       Current Inpatient Medications:   nitroGLYCERIN  0.4 mg Sublingual 1 time in Clinic/HOD       Current Facility-Administered Medications on File Prior to Encounter   Medication Dose Route Frequency Provider Last  Rate Last Admin    nitroGLYCERIN SL tablet 0.4 mg  0.4 mg Sublingual 1 time in Clinic/HOD Tomer Gonzalez MD         Current Outpatient Medications on File Prior to Encounter   Medication Sig Dispense Refill    fenofibrate (TRICOR) 145 MG tablet Take 1 tablet by mouth once daily. (Patient taking differently: Take 145 mg by mouth every evening.) 30 tablet 3    losartan-hydrochlorothiazide 100-25 mg (HYZAAR) 100-25 mg per tablet Take 1 tablet by mouth once daily.      multivitamin capsule Take 1 capsule by mouth once daily.      topiramate (TOPAMAX) 50 MG tablet Take 1 tablet (50 mg total) by mouth 2 (two) times daily. 90 tablet 3    ascorbic acid, vitamin C, (VITAMIN C) 1000 MG tablet Take 1,000 mg by mouth once daily.      B6-folic-B12-coffee-phosphatid 1.7 mg-400 mcg- 2.4 mcg Cap Take by mouth.      ciprofloxacin HCl (CIPRO) 500 MG tablet Take 500 mg by mouth 2 (two) times daily.      diclofenac (VOLTAREN) 50 MG EC tablet TAKE 1 TABLET TWICE DAILY (Patient taking differently: 100 mg. Once every 3 days) 180 tablet 0    levothyroxine (SYNTHROID) 75 MCG tablet Take 75 mcg by mouth.      lisinopril-hydrochlorothiazide (PRINZIDE,ZESTORETIC) 10-12.5 mg per tablet Take 1 tablet by mouth once daily. (Patient not taking: Reported on 1/16/2024) 90 tablet 3    metroNIDAZOLE (FLAGYL) 500 MG tablet Take 500 mg by mouth every 12 (twelve) hours.      omeprazole (PRILOSEC) 40 MG capsule Take 1 capsule (40 mg total) by mouth once daily. 90 capsule 3    tramadol (ULTRAM) 50 mg tablet TAKE ONE TABLET BY MOUTH THREE TIMES DAILY AS NEEDED (Patient not taking: Reported on 1/16/2024) 90 tablet 3    vitamin E 400 UNIT capsule Take 400 Units by mouth once daily.         Past Surgical History:   Procedure Laterality Date    COLONOSCOPY N/A 12/13/2023    Procedure: COLONOSCOPY;  Surgeon: Aman Hooks MD;  Location: [facility];  Service: Endoscopy;  Laterality: N/A;    HYSTERECTOMY      TONSILLECTOMY         Social History      Socioeconomic History    Marital status:     Years of education: college   Tobacco Use    Smoking status: Never    Smokeless tobacco: Never   Substance and Sexual Activity    Alcohol use: No     Alcohol/week: 0.0 standard drinks of alcohol    Drug use: No       OBJECTIVE:     Vital Signs Range (Last 24H):         Significant Labs:  Lab Results   Component Value Date    WBC 7.52 11/08/2022    HGB 12.5 11/08/2022    HCT 39.5 11/08/2022     11/08/2022    CHOL 188 11/08/2022    TRIG 223 (H) 11/08/2022    HDL 43 11/08/2022    ALT 25 03/23/2016    AST 25 03/23/2016     11/08/2022    K 4.5 11/08/2022     11/08/2022    CREATININE 1.2 12/14/2023    BUN 36 (H) 11/08/2022    CO2 21 (L) 11/08/2022    TSH 5.200 (H) 03/23/2016    INR 1.0 03/10/2014    HGBA1C 5.6 11/08/2022       Diagnostic Studies: No relevant studies.    EKG:   Results for orders placed or performed in visit on 11/08/22   IN OFFICE EKG 12-LEAD (to [place])    Collection Time: 11/08/22  4:00 PM    Narrative    Test Reason : R06.02,    Vent. Rate : 080 BPM     Atrial Rate : 080 BPM     P-R Int : 156 ms          QRS Dur : 092 ms      QT Int : 376 ms       P-R-T Axes : 042 015 005 degrees     QTc Int : 433 ms    Normal sinus rhythm  Nonspecific T wave abnormality  Abnormal ECG  When compared with ECG of 10-MAR-2014 23:01,  Nonspecific T wave abnormality now evident in Anterior leads  Confirmed by LEIDY GOLDMAN MD ([id number]) on 11/8/2022 3:59:04 PM    Referred By: [email or web address]   SELF           Confirmed By:LEIDY GOLDMAN MD       2D ECHO:  TTE:  No results found for this or any previous visit.    RONALDO:  No results found for this or any previous visit.    ASSESSMENT/PLAN:       Pre-op Assessment    I have reviewed the Patient Summary Reports.     I have reviewed the Nursing Notes. I have reviewed the NPO Status.   I have reviewed the Medications.     Review of Systems  Anesthesia Hx:   History of prior surgery of interest to airway management or  planning:          Denies Family Hx of Anesthesia complications.   Personal Hx of Anesthesia complications, Post-Operative Nausea/Vomiting, with every anesthetic, treatment not known                    Social:  Non-Smoker       Hematology/Oncology:  Hematology Normal   Oncology Normal                                   EENT/Dental:  EENT/Dental Normal           Cardiovascular:     Hypertension                                  Hypertension         Pulmonary:      Shortness of breath                  Hepatic/GI:   PUD,        Peptic Ulcer Disease          Musculoskeletal:  Arthritis               Neurological:      Headaches      Dx of Headaches                           Endocrine:   Hypothyroidism       Hypothyroidism        Morbid Obesity / BMI > 40  Psych:  Psychiatric Normal                    Physical Exam  General: Well nourished, Cooperative, Alert and Oriented    Airway:  Mallampati: II   Mouth Opening: Normal  TM Distance: Normal  Tongue: Normal  Neck ROM: Normal ROM        Anesthesia Plan  Type of Anesthesia, risks & benefits discussed:    Anesthesia Type: Gen ETT  Intra-op Monitoring Plan: Standard ASA Monitors  Post Op Pain Control Plan: multimodal analgesia and IV/PO Opioids PRN  Induction:  IV  Airway Plan: Direct, Post-Induction  Informed Consent: Informed consent signed with the Patient and all parties understand the risks and agree with anesthesia plan.  All questions answered.   ASA Score: 3  Day of Surgery Review of History & Physical: H&P Update referred to the surgeon/provider.    Ready For Surgery From Anesthesia Perspective.     .

## 2024-01-24 ENCOUNTER — HOSPITAL ENCOUNTER (INPATIENT)
Facility: HOSPITAL | Age: 69
LOS: 2 days | Discharge: HOME | DRG: 330 | End: 2024-01-26
Attending: COLON & RECTAL SURGERY | Admitting: COLON & RECTAL SURGERY
Payer: MEDICARE

## 2024-01-24 ENCOUNTER — ANESTHESIA (OUTPATIENT)
Dept: SURGERY | Facility: HOSPITAL | Age: 69
DRG: 330 | End: 2024-01-24
Payer: MEDICARE

## 2024-01-24 DIAGNOSIS — K63.2 COLONIC FISTULA: Primary | ICD-10-CM

## 2024-01-24 DIAGNOSIS — C18.9 COLON CANCER: ICD-10-CM

## 2024-01-24 LAB
ABO + RH BLD: NORMAL
BLD GP AB SCN CELLS X3 SERPL QL: NORMAL
CREAT SERPL-MCNC: 1.4 MG/DL (ref 0.5–1.4)
EST. GFR  (NO RACE VARIABLE): 40.5 ML/MIN/1.73 M^2
SPECIMEN OUTDATE: NORMAL

## 2024-01-24 PROCEDURE — 47562 LAPAROSCOPIC CHOLECYSTECTOMY: CPT | Mod: ,,, | Performed by: SURGERY

## 2024-01-24 PROCEDURE — 99900035 HC TECH TIME PER 15 MIN (STAT)

## 2024-01-24 PROCEDURE — D9220A PRA ANESTHESIA: Mod: CRNA,,, | Performed by: REGISTERED NURSE

## 2024-01-24 PROCEDURE — 36415 COLL VENOUS BLD VENIPUNCTURE: CPT | Performed by: NURSE PRACTITIONER

## 2024-01-24 PROCEDURE — 88341 IMHCHEM/IMCYTCHM EA ADD ANTB: CPT | Mod: 59 | Performed by: STUDENT IN AN ORGANIZED HEALTH CARE EDUCATION/TRAINING PROGRAM

## 2024-01-24 PROCEDURE — 63600175 PHARM REV CODE 636 W HCPCS

## 2024-01-24 PROCEDURE — 0DNU4ZZ RELEASE OMENTUM, PERCUTANEOUS ENDOSCOPIC APPROACH: ICD-10-PCS | Performed by: COLON & RECTAL SURGERY

## 2024-01-24 PROCEDURE — 25000003 PHARM REV CODE 250: Performed by: REGISTERED NURSE

## 2024-01-24 PROCEDURE — 36000710: Performed by: COLON & RECTAL SURGERY

## 2024-01-24 PROCEDURE — 88304 TISSUE EXAM BY PATHOLOGIST: CPT | Mod: 26,,, | Performed by: STUDENT IN AN ORGANIZED HEALTH CARE EDUCATION/TRAINING PROGRAM

## 2024-01-24 PROCEDURE — 25000003 PHARM REV CODE 250: Performed by: STUDENT IN AN ORGANIZED HEALTH CARE EDUCATION/TRAINING PROGRAM

## 2024-01-24 PROCEDURE — 20600001 HC STEP DOWN PRIVATE ROOM

## 2024-01-24 PROCEDURE — 71000016 HC POSTOP RECOV ADDL HR: Performed by: COLON & RECTAL SURGERY

## 2024-01-24 PROCEDURE — 88307 TISSUE EXAM BY PATHOLOGIST: CPT | Mod: 26,,, | Performed by: STUDENT IN AN ORGANIZED HEALTH CARE EDUCATION/TRAINING PROGRAM

## 2024-01-24 PROCEDURE — 71000033 HC RECOVERY, INTIAL HOUR: Performed by: COLON & RECTAL SURGERY

## 2024-01-24 PROCEDURE — 88342 IMHCHEM/IMCYTCHM 1ST ANTB: CPT | Mod: 26,,, | Performed by: STUDENT IN AN ORGANIZED HEALTH CARE EDUCATION/TRAINING PROGRAM

## 2024-01-24 PROCEDURE — 63600175 PHARM REV CODE 636 W HCPCS: Performed by: STUDENT IN AN ORGANIZED HEALTH CARE EDUCATION/TRAINING PROGRAM

## 2024-01-24 PROCEDURE — 37000008 HC ANESTHESIA 1ST 15 MINUTES: Performed by: COLON & RECTAL SURGERY

## 2024-01-24 PROCEDURE — 88342 IMHCHEM/IMCYTCHM 1ST ANTB: CPT | Performed by: STUDENT IN AN ORGANIZED HEALTH CARE EDUCATION/TRAINING PROGRAM

## 2024-01-24 PROCEDURE — 44205 LAP COLECTOMY PART W/ILEUM: CPT | Mod: ,,, | Performed by: COLON & RECTAL SURGERY

## 2024-01-24 PROCEDURE — 25000003 PHARM REV CODE 250: Performed by: NURSE PRACTITIONER

## 2024-01-24 PROCEDURE — 25000003 PHARM REV CODE 250

## 2024-01-24 PROCEDURE — 36000711: Performed by: COLON & RECTAL SURGERY

## 2024-01-24 PROCEDURE — 0FT44ZZ RESECTION OF GALLBLADDER, PERCUTANEOUS ENDOSCOPIC APPROACH: ICD-10-PCS | Performed by: SURGERY

## 2024-01-24 PROCEDURE — 63600175 PHARM REV CODE 636 W HCPCS: Performed by: NURSE PRACTITIONER

## 2024-01-24 PROCEDURE — 27201423 OPTIME MED/SURG SUP & DEVICES STERILE SUPPLY: Performed by: COLON & RECTAL SURGERY

## 2024-01-24 PROCEDURE — 94761 N-INVAS EAR/PLS OXIMETRY MLT: CPT

## 2024-01-24 PROCEDURE — 37000009 HC ANESTHESIA EA ADD 15 MINS: Performed by: COLON & RECTAL SURGERY

## 2024-01-24 PROCEDURE — 86901 BLOOD TYPING SEROLOGIC RH(D): CPT | Performed by: NURSE PRACTITIONER

## 2024-01-24 PROCEDURE — 82565 ASSAY OF CREATININE: CPT | Performed by: STUDENT IN AN ORGANIZED HEALTH CARE EDUCATION/TRAINING PROGRAM

## 2024-01-24 PROCEDURE — 88341 IMHCHEM/IMCYTCHM EA ADD ANTB: CPT | Mod: 26,,, | Performed by: STUDENT IN AN ORGANIZED HEALTH CARE EDUCATION/TRAINING PROGRAM

## 2024-01-24 PROCEDURE — 27000221 HC OXYGEN, UP TO 24 HOURS

## 2024-01-24 PROCEDURE — 0DTF4ZZ RESECTION OF RIGHT LARGE INTESTINE, PERCUTANEOUS ENDOSCOPIC APPROACH: ICD-10-PCS | Performed by: COLON & RECTAL SURGERY

## 2024-01-24 PROCEDURE — 88304 TISSUE EXAM BY PATHOLOGIST: CPT | Performed by: STUDENT IN AN ORGANIZED HEALTH CARE EDUCATION/TRAINING PROGRAM

## 2024-01-24 PROCEDURE — 71000015 HC POSTOP RECOV 1ST HR: Performed by: COLON & RECTAL SURGERY

## 2024-01-24 PROCEDURE — 88307 TISSUE EXAM BY PATHOLOGIST: CPT | Performed by: STUDENT IN AN ORGANIZED HEALTH CARE EDUCATION/TRAINING PROGRAM

## 2024-01-24 PROCEDURE — D9220A PRA ANESTHESIA: Mod: ANES,,, | Performed by: ANESTHESIOLOGY

## 2024-01-24 RX ORDER — HYDROMORPHONE HYDROCHLORIDE 1 MG/ML
0.2 INJECTION, SOLUTION INTRAMUSCULAR; INTRAVENOUS; SUBCUTANEOUS EVERY 5 MIN PRN
Status: DISCONTINUED | OUTPATIENT
Start: 2024-01-24 | End: 2024-01-24 | Stop reason: HOSPADM

## 2024-01-24 RX ORDER — FENTANYL CITRATE 50 UG/ML
INJECTION, SOLUTION INTRAMUSCULAR; INTRAVENOUS
Status: DISCONTINUED | OUTPATIENT
Start: 2024-01-24 | End: 2024-01-24

## 2024-01-24 RX ORDER — PANTOPRAZOLE SODIUM 40 MG/1
40 TABLET, DELAYED RELEASE ORAL DAILY
Status: DISCONTINUED | OUTPATIENT
Start: 2024-01-25 | End: 2024-01-26 | Stop reason: HOSPADM

## 2024-01-24 RX ORDER — ALVIMOPAN 12 MG/1
12 CAPSULE ORAL 2 TIMES DAILY
Status: DISCONTINUED | OUTPATIENT
Start: 2024-01-25 | End: 2024-01-26 | Stop reason: HOSPADM

## 2024-01-24 RX ORDER — LIDOCAINE HYDROCHLORIDE 20 MG/ML
INJECTION, SOLUTION EPIDURAL; INFILTRATION; INTRACAUDAL; PERINEURAL
Status: DISCONTINUED | OUTPATIENT
Start: 2024-01-24 | End: 2024-01-24

## 2024-01-24 RX ORDER — PROPOFOL 10 MG/ML
VIAL (ML) INTRAVENOUS
Status: DISCONTINUED | OUTPATIENT
Start: 2024-01-24 | End: 2024-01-24

## 2024-01-24 RX ORDER — HEPARIN SODIUM 5000 [USP'U]/ML
5000 INJECTION, SOLUTION INTRAVENOUS; SUBCUTANEOUS EVERY 8 HOURS
Status: COMPLETED | OUTPATIENT
Start: 2024-01-24 | End: 2024-01-24

## 2024-01-24 RX ORDER — HALOPERIDOL 5 MG/ML
INJECTION INTRAMUSCULAR
Status: DISCONTINUED | OUTPATIENT
Start: 2024-01-24 | End: 2024-01-24

## 2024-01-24 RX ORDER — GABAPENTIN 300 MG/1
300 CAPSULE ORAL 3 TIMES DAILY
Status: DISCONTINUED | OUTPATIENT
Start: 2024-01-24 | End: 2024-01-26 | Stop reason: HOSPADM

## 2024-01-24 RX ORDER — ENOXAPARIN SODIUM 100 MG/ML
40 INJECTION SUBCUTANEOUS EVERY 24 HOURS
Status: DISCONTINUED | OUTPATIENT
Start: 2024-01-25 | End: 2024-01-26 | Stop reason: HOSPADM

## 2024-01-24 RX ORDER — HALOPERIDOL 5 MG/ML
0.5 INJECTION INTRAMUSCULAR EVERY 10 MIN PRN
Status: DISCONTINUED | OUTPATIENT
Start: 2024-01-24 | End: 2024-01-24 | Stop reason: HOSPADM

## 2024-01-24 RX ORDER — SODIUM CHLORIDE 9 MG/ML
INJECTION, SOLUTION INTRAVENOUS
Status: DISCONTINUED | OUTPATIENT
Start: 2024-01-24 | End: 2024-01-24

## 2024-01-24 RX ORDER — SODIUM CHLORIDE 0.9 % (FLUSH) 0.9 %
10 SYRINGE (ML) INJECTION
Status: DISCONTINUED | OUTPATIENT
Start: 2024-01-24 | End: 2024-01-24 | Stop reason: HOSPADM

## 2024-01-24 RX ORDER — LIDOCAINE HYDROCHLORIDE ANHYDROUS AND DEXTROSE MONOHYDRATE .8; 5 G/100ML; G/100ML
INJECTION, SOLUTION INTRAVENOUS CONTINUOUS PRN
Status: DISCONTINUED | OUTPATIENT
Start: 2024-01-24 | End: 2024-01-24

## 2024-01-24 RX ORDER — MUPIROCIN 20 MG/G
OINTMENT TOPICAL 2 TIMES DAILY
Status: DISCONTINUED | OUTPATIENT
Start: 2024-01-24 | End: 2024-01-26 | Stop reason: HOSPADM

## 2024-01-24 RX ORDER — SODIUM CHLORIDE 9 MG/ML
INJECTION, SOLUTION INTRAVENOUS CONTINUOUS
Status: DISCONTINUED | OUTPATIENT
Start: 2024-01-24 | End: 2024-01-25

## 2024-01-24 RX ORDER — TRIPROLIDINE/PSEUDOEPHEDRINE 2.5MG-60MG
600 TABLET ORAL
Status: COMPLETED | OUTPATIENT
Start: 2024-01-24 | End: 2024-01-24

## 2024-01-24 RX ORDER — MUPIROCIN 20 MG/G
1 OINTMENT TOPICAL
Status: COMPLETED | OUTPATIENT
Start: 2024-01-24 | End: 2024-01-24

## 2024-01-24 RX ORDER — LIDOCAINE HYDROCHLORIDE 10 MG/ML
1 INJECTION, SOLUTION EPIDURAL; INFILTRATION; INTRACAUDAL; PERINEURAL
Status: DISCONTINUED | OUTPATIENT
Start: 2024-01-24 | End: 2024-01-24

## 2024-01-24 RX ORDER — FENOFIBRATE 145 MG/1
145 TABLET, FILM COATED ORAL NIGHTLY
Status: DISCONTINUED | OUTPATIENT
Start: 2024-01-24 | End: 2024-01-26 | Stop reason: HOSPADM

## 2024-01-24 RX ORDER — ACETAMINOPHEN 10 MG/ML
1000 INJECTION, SOLUTION INTRAVENOUS EVERY 8 HOURS
Status: COMPLETED | OUTPATIENT
Start: 2024-01-24 | End: 2024-01-25

## 2024-01-24 RX ORDER — METRONIDAZOLE 500 MG/100ML
500 INJECTION, SOLUTION INTRAVENOUS
Status: COMPLETED | OUTPATIENT
Start: 2024-01-24 | End: 2024-01-24

## 2024-01-24 RX ORDER — ACETAMINOPHEN 650 MG/20.3ML
975 LIQUID ORAL
Status: COMPLETED | OUTPATIENT
Start: 2024-01-24 | End: 2024-01-24

## 2024-01-24 RX ORDER — ONDANSETRON HYDROCHLORIDE 2 MG/ML
INJECTION, SOLUTION INTRAVENOUS
Status: DISCONTINUED | OUTPATIENT
Start: 2024-01-24 | End: 2024-01-24

## 2024-01-24 RX ORDER — TRAMADOL HYDROCHLORIDE 50 MG/1
50 TABLET ORAL EVERY 6 HOURS PRN
Status: DISCONTINUED | OUTPATIENT
Start: 2024-01-24 | End: 2024-01-26 | Stop reason: HOSPADM

## 2024-01-24 RX ORDER — GABAPENTIN 300 MG/1
300 CAPSULE ORAL 3 TIMES DAILY
Status: DISCONTINUED | OUTPATIENT
Start: 2024-01-24 | End: 2024-01-24 | Stop reason: SDUPTHER

## 2024-01-24 RX ORDER — EPHEDRINE SULFATE 50 MG/ML
INJECTION, SOLUTION INTRAVENOUS
Status: DISCONTINUED | OUTPATIENT
Start: 2024-01-24 | End: 2024-01-24

## 2024-01-24 RX ORDER — KETAMINE HCL IN 0.9 % NACL 50 MG/5 ML
SYRINGE (ML) INTRAVENOUS
Status: DISCONTINUED | OUTPATIENT
Start: 2024-01-24 | End: 2024-01-24

## 2024-01-24 RX ORDER — IBUPROFEN 400 MG/1
800 TABLET ORAL EVERY 8 HOURS
Status: DISCONTINUED | OUTPATIENT
Start: 2024-01-25 | End: 2024-01-26 | Stop reason: HOSPADM

## 2024-01-24 RX ORDER — PHENYLEPHRINE HYDROCHLORIDE 10 MG/ML
INJECTION INTRAVENOUS
Status: DISCONTINUED | OUTPATIENT
Start: 2024-01-24 | End: 2024-01-24

## 2024-01-24 RX ORDER — GABAPENTIN 300 MG/1
300 CAPSULE ORAL
Status: COMPLETED | OUTPATIENT
Start: 2024-01-24 | End: 2024-01-24

## 2024-01-24 RX ORDER — NITROGLYCERIN 0.4 MG/1
0.4 TABLET SUBLINGUAL
Status: DISCONTINUED | OUTPATIENT
Start: 2024-01-24 | End: 2024-01-26 | Stop reason: HOSPADM

## 2024-01-24 RX ORDER — ONDANSETRON HYDROCHLORIDE 2 MG/ML
4 INJECTION, SOLUTION INTRAVENOUS EVERY 6 HOURS PRN
Status: DISCONTINUED | OUTPATIENT
Start: 2024-01-24 | End: 2024-01-26 | Stop reason: HOSPADM

## 2024-01-24 RX ORDER — ROCURONIUM BROMIDE 10 MG/ML
INJECTION, SOLUTION INTRAVENOUS
Status: DISCONTINUED | OUTPATIENT
Start: 2024-01-24 | End: 2024-01-24

## 2024-01-24 RX ORDER — DEXAMETHASONE SODIUM PHOSPHATE 4 MG/ML
INJECTION, SOLUTION INTRA-ARTICULAR; INTRALESIONAL; INTRAMUSCULAR; INTRAVENOUS; SOFT TISSUE
Status: DISCONTINUED | OUTPATIENT
Start: 2024-01-24 | End: 2024-01-24

## 2024-01-24 RX ORDER — OXYCODONE HYDROCHLORIDE 5 MG/1
5 TABLET ORAL EVERY 6 HOURS PRN
Status: DISCONTINUED | OUTPATIENT
Start: 2024-01-24 | End: 2024-01-26 | Stop reason: HOSPADM

## 2024-01-24 RX ORDER — DEXMEDETOMIDINE HYDROCHLORIDE 100 UG/ML
INJECTION, SOLUTION INTRAVENOUS
Status: DISCONTINUED | OUTPATIENT
Start: 2024-01-24 | End: 2024-01-24

## 2024-01-24 RX ORDER — LEVOTHYROXINE SODIUM 75 UG/1
75 TABLET ORAL
Status: DISCONTINUED | OUTPATIENT
Start: 2024-01-25 | End: 2024-01-26 | Stop reason: HOSPADM

## 2024-01-24 RX ORDER — ACETAMINOPHEN 500 MG
1000 TABLET ORAL EVERY 8 HOURS
Status: DISCONTINUED | OUTPATIENT
Start: 2024-01-25 | End: 2024-01-26 | Stop reason: HOSPADM

## 2024-01-24 RX ORDER — ALVIMOPAN 12 MG/1
12 CAPSULE ORAL
Status: COMPLETED | OUTPATIENT
Start: 2024-01-24 | End: 2024-01-24

## 2024-01-24 RX ORDER — TOPIRAMATE 25 MG/1
50 TABLET ORAL 2 TIMES DAILY
Status: DISCONTINUED | OUTPATIENT
Start: 2024-01-24 | End: 2024-01-26 | Stop reason: HOSPADM

## 2024-01-24 RX ORDER — OXYCODONE HYDROCHLORIDE 10 MG/1
10 TABLET ORAL EVERY 4 HOURS PRN
Status: DISCONTINUED | OUTPATIENT
Start: 2024-01-24 | End: 2024-01-26 | Stop reason: HOSPADM

## 2024-01-24 RX ORDER — SCOLOPAMINE TRANSDERMAL SYSTEM 1 MG/1
1 PATCH, EXTENDED RELEASE TRANSDERMAL ONCE
Status: DISCONTINUED | OUTPATIENT
Start: 2024-01-24 | End: 2024-01-24

## 2024-01-24 RX ORDER — SODIUM CHLORIDE 0.9 % (FLUSH) 0.9 %
10 SYRINGE (ML) INJECTION
Status: DISCONTINUED | OUTPATIENT
Start: 2024-01-24 | End: 2024-01-26 | Stop reason: HOSPADM

## 2024-01-24 RX ADMIN — IBUPROFEN 600 MG: 100 SUSPENSION ORAL at 10:01

## 2024-01-24 RX ADMIN — SCOPALAMINE 1 PATCH: 1 PATCH, EXTENDED RELEASE TRANSDERMAL at 10:01

## 2024-01-24 RX ADMIN — SODIUM CHLORIDE: 9 INJECTION, SOLUTION INTRAVENOUS at 12:01

## 2024-01-24 RX ADMIN — HALOPERIDOL LACTATE 0.5 MG: 5 INJECTION, SOLUTION INTRAMUSCULAR at 02:01

## 2024-01-24 RX ADMIN — PHENYLEPHRINE HYDROCHLORIDE 100 MCG: 10 INJECTION INTRAVENOUS at 12:01

## 2024-01-24 RX ADMIN — ROCURONIUM BROMIDE 50 MG: 10 INJECTION, SOLUTION INTRAVENOUS at 12:01

## 2024-01-24 RX ADMIN — GABAPENTIN 300 MG: 300 CAPSULE ORAL at 08:01

## 2024-01-24 RX ADMIN — FENOFIBRATE 145 MG: 145 TABLET, FILM COATED ORAL at 08:01

## 2024-01-24 RX ADMIN — HYDROMORPHONE HYDROCHLORIDE 0.2 MG: 1 INJECTION, SOLUTION INTRAMUSCULAR; INTRAVENOUS; SUBCUTANEOUS at 04:01

## 2024-01-24 RX ADMIN — GLYCOPYRROLATE 0.2 MG: 0.2 INJECTION, SOLUTION INTRAMUSCULAR; INTRAVENOUS at 12:01

## 2024-01-24 RX ADMIN — MUPIROCIN 1 G: 20 OINTMENT TOPICAL at 10:01

## 2024-01-24 RX ADMIN — ALVIMOPAN 12 MG: 12 CAPSULE ORAL at 10:01

## 2024-01-24 RX ADMIN — GABAPENTIN 300 MG: 300 CAPSULE ORAL at 10:01

## 2024-01-24 RX ADMIN — ROCURONIUM BROMIDE 25 MG: 10 INJECTION, SOLUTION INTRAVENOUS at 01:01

## 2024-01-24 RX ADMIN — PHENYLEPHRINE HYDROCHLORIDE 200 MCG: 10 INJECTION INTRAVENOUS at 12:01

## 2024-01-24 RX ADMIN — DEXMEDETOMIDINE 8 MCG: 100 INJECTION, SOLUTION, CONCENTRATE INTRAVENOUS at 02:01

## 2024-01-24 RX ADMIN — TOPIRAMATE 50 MG: 25 TABLET, FILM COATED ORAL at 08:01

## 2024-01-24 RX ADMIN — GABAPENTIN 300 MG: 300 CAPSULE ORAL at 04:01

## 2024-01-24 RX ADMIN — PROPOFOL 150 MG: 10 INJECTION, EMULSION INTRAVENOUS at 12:01

## 2024-01-24 RX ADMIN — SUGAMMADEX 400 MG: 100 INJECTION, SOLUTION INTRAVENOUS at 02:01

## 2024-01-24 RX ADMIN — TRAMADOL HYDROCHLORIDE 50 MG: 50 TABLET, COATED ORAL at 08:01

## 2024-01-24 RX ADMIN — LIDOCAINE HYDROCHLORIDE 100 MG: 20 INJECTION, SOLUTION EPIDURAL; INFILTRATION; INTRACAUDAL; PERINEURAL at 12:01

## 2024-01-24 RX ADMIN — HALOPERIDOL LACTATE 0.5 MG: 5 INJECTION, SOLUTION INTRAMUSCULAR at 04:01

## 2024-01-24 RX ADMIN — MUPIROCIN: 20 OINTMENT TOPICAL at 08:01

## 2024-01-24 RX ADMIN — SODIUM CHLORIDE: 9 INJECTION, SOLUTION INTRAVENOUS at 03:01

## 2024-01-24 RX ADMIN — DEXAMETHASONE SODIUM PHOSPHATE 8 MG: 4 INJECTION, SOLUTION INTRAMUSCULAR; INTRAVENOUS at 12:01

## 2024-01-24 RX ADMIN — Medication 10 MG: at 01:01

## 2024-01-24 RX ADMIN — HEPARIN SODIUM 5000 UNITS: 5000 INJECTION INTRAVENOUS; SUBCUTANEOUS at 10:01

## 2024-01-24 RX ADMIN — IBUPROFEN 800 MG: 800 INJECTION INTRAVENOUS at 10:01

## 2024-01-24 RX ADMIN — DEXMEDETOMIDINE 8 MCG: 100 INJECTION, SOLUTION, CONCENTRATE INTRAVENOUS at 01:01

## 2024-01-24 RX ADMIN — GENTAMICIN SULFATE 355.2 MG: 40 INJECTION, SOLUTION INTRAMUSCULAR; INTRAVENOUS at 11:01

## 2024-01-24 RX ADMIN — ROCURONIUM BROMIDE 20 MG: 10 INJECTION, SOLUTION INTRAVENOUS at 02:01

## 2024-01-24 RX ADMIN — LIDOCAINE HYDROCHLORIDE ANHYDROUS AND DEXTROSE MONOHYDRATE 0.02 MG/KG/MIN: .8; 5 INJECTION, SOLUTION INTRAVENOUS at 12:01

## 2024-01-24 RX ADMIN — ACETAMINOPHEN 1000 MG: 10 INJECTION, SOLUTION INTRAVENOUS at 09:01

## 2024-01-24 RX ADMIN — METRONIDAZOLE 500 MG: 5 INJECTION, SOLUTION INTRAVENOUS at 10:01

## 2024-01-24 RX ADMIN — SODIUM CHLORIDE, SODIUM GLUCONATE, SODIUM ACETATE, POTASSIUM CHLORIDE, MAGNESIUM CHLORIDE, SODIUM PHOSPHATE, DIBASIC, AND POTASSIUM PHOSPHATE: .53; .5; .37; .037; .03; .012; .00082 INJECTION, SOLUTION INTRAVENOUS at 12:01

## 2024-01-24 RX ADMIN — FENTANYL CITRATE 100 MCG: 50 INJECTION, SOLUTION INTRAMUSCULAR; INTRAVENOUS at 12:01

## 2024-01-24 RX ADMIN — ONDANSETRON 4 MG: 2 INJECTION INTRAMUSCULAR; INTRAVENOUS at 02:01

## 2024-01-24 RX ADMIN — EPHEDRINE SULFATE 5 MG: 50 INJECTION INTRAVENOUS at 12:01

## 2024-01-24 RX ADMIN — ROCURONIUM BROMIDE 30 MG: 10 INJECTION, SOLUTION INTRAVENOUS at 01:01

## 2024-01-24 RX ADMIN — ACETAMINOPHEN 976.6 MG: 650 SOLUTION ORAL at 10:01

## 2024-01-24 NOTE — TRANSFER OF CARE
"Anesthesia Transfer of Care Note    Patient: Danyell Mathias    Procedure(s) Performed: Procedure(s) (LRB):  COLECTOMY, RIGHT, LAPAROSCOPIC, ERAS low (N/A)  CHOLECYSTECTOMY, LAPAROSCOPIC (N/A)    Patient location: PACU    Anesthesia Type: general    Transport from OR: Transported from OR on 6-10 L/min O2 by face mask with adequate spontaneous ventilation    Post pain: adequate analgesia    Post assessment: no apparent anesthetic complications and tolerated procedure well    Post vital signs: stable    Level of consciousness: responds to stimulation    Nausea/Vomiting: no nausea/vomiting    Complications: none    Transfer of care protocol was followedComments: Nurse at bedside, VSS, spont reg resp noted      Last vitals: Visit Vitals  BP (!) 120/59   Pulse 79   Temp 36.5 °C (97.7 °F) (Temporal)   Resp 17   Ht 5' 1" (1.549 m)   Wt 105.7 kg (233 lb)   LMP  (LMP Unknown)   SpO2 99%   Breastfeeding No   BMI 44.02 kg/m²     "

## 2024-01-24 NOTE — OP NOTE
[facility] - Surgery ([facility])  Surgery Department  Operative Note       Date of Procedure: 1/24/2024       Surgeon(s):  Surgeon(s) and Role:  Panel 1:     * DAI Valdovinos MD - Primary     * Kingsley Jules MD - Resident - Assisting  Panel 2:     * Denny Watkins MD - Primary     * Naveed Young MD - Resident - Assisting      Pre-Operative Diagnosis:   Cholecystocolonic fistula    Post-Operative Diagnosis:   Same     Anesthesia: General    Operative Findings:   Fistula associated with gb fundus and hepatic flexure    Procedures:  Laparoscopic   -cholecystectomy  -right colon resection (Dr. Valdovinos)    Estimated Blood Loss (EBL):  <20 mL    Specimen(s):    Specimen (24h ago, onward)       Start     Ordered    01/24/24 1422  Specimen to Pathology, Surgery Gastrointestinal tract  Once        Comments: Pre-op Diagnosis: Colonic fistula [K63.2]Procedure(s):COLECTOMY, RIGHT, LAPAROSCOPIC, ERAS lowCHOLECYSTECTOMY, LAPAROSCOPIC Number of specimens: 2Name of specimens: 1.) Gallbladder and fistula to colon - permanent, 2.) Right colon and omentum - permanent.     References:    Click here for ordering Quick Tip   Question Answer Comment   Procedure Type: Gastrointestinal tract    Specimen Class: Routine/Screening    Which provider would you like to cc? DENNY WATKINS    Which provider would you like to cc? DAI VALDOVINOS        01/24/24 2674                           Indications:  Danyell Mathias presents for the above procedures.  Risks and benefits were reviewed including bleeding, infection, damage to local structures, biliary leak/damage, need for additional procedures, death, and imponderables.  She understands and gave informed consent to proceed.    Details:  We joined the case after the patient was positioned and pneumoperitoneum was established.  Ports were placed.  The upper abdomen was explored and was consistent with a fistula from the gallbladder to the hepatic  flexure/proximal transverse colon.  We were able to develop a safe plane around the fistula at the level of the fundus allowing the fistula to be staple divided with a green [place] stapler.  This  the structures effectively, and allowed us to retract the gallbladder in the usual fashion.  Landmarks were established and the dissection was kept well above the david on this contracted gallbladder.  The peritoneum was opened bilaterally and the cystic duct safely encircled and divided between Weck clips, the cystic artery was divided on the gallbladder wall with clips and Ligasure.  The gallbladder was removed retrograde off the cystic plate with cautery.  The specimen was placed in a plastic bag for later retrieval following the colectomy.  Raytecs were removed and the field inspected for hemostasis.  Case was turned over to colorectal surgery.    I communicated the intraoperative findings with the family following the procedure.     Condition: Stable    Disposition:  to CRS    Attestation: I was present and scrubbed for the key portions of the procedure.

## 2024-01-24 NOTE — H&P
Surgery Clinic Note - H and P    Subjective:     Danyell Mathias is a 68 y.o. female who presented with cholecysto-colonic fistula. She reports a remote history of gallstones and a provider recommending her having her gallbladder out in the past, but she never sought treatment. She denies recurrent cholecystitis or bouts of biliary colic. She has no right upper quadrant abdominal pain. Previous hysterectomy through pfannenstiel incision.  No other abdominal surgeries.  Functionally, she is active and healthy.  Nondiabetic.  No heart disease.  She is daily bowel movements.  No recent change in her weight.  No issues with fecal incontinence.  Does have a significant family history of colon cancer in her father.     Colonoscopy done on 12/13/23 for + FIT test.  Found to have a colonic fistula.   CT abd pelvis: 12/14/23:  demonstrates a diminutive gallbladder with a fistula tract within the hepatic flexure of the colon; there is pneumobilia throughout the biliary ductal system     Last Colonoscopy: 12/13/2023:   Impression:            - Non-bleeding internal hemorrhoids.                          - Suspected Colonic fistula with surrounding                          irregular tissue, biopsied and tattoo placed just                          distal..                          - Diverticulosis in the sigmoid colon and in the                          descending colon.                          - The examination was otherwise normal on direct                          and retroflexion views.                          There is what appears to be pinhole opening with                          irregular mucosa, i suspect at the hepatic                          flexure, tattooed just distal, biopsied.        PMH:   Past Medical History:   Diagnosis Date    Allergy     Arthritis     Encounter for blood transfusion     Gastric ulcer     Hypertension     Migraine headache        Past Surgical History:   Past Surgical History:    Procedure Laterality Date    COLONOSCOPY N/A 12/13/2023    Procedure: COLONOSCOPY;  Surgeon: Aman Hooks MD;  Location: [facility];  Service: Endoscopy;  Laterality: N/A;    HYSTERECTOMY      TONSILLECTOMY         Social History:  Social History     Socioeconomic History    Marital status:     Years of education: college   Tobacco Use    Smoking status: Never    Smokeless tobacco: Never   Substance and Sexual Activity    Alcohol use: No     Alcohol/week: 0.0 standard drinks of alcohol    Drug use: No       Allergies:   Review of patient's allergies indicates:   Allergen Reactions    Latex, natural rubber Other (See Comments)     Blisters      Macrolide antibiotics Nausea And Vomiting     Erythromycin      Penicillins Itching    Sulfa (sulfonamide antibiotics) Itching       Medications:  Current Facility-Administered Medications:     0.9%  NaCl infusion, , Intravenous, On Call Procedure, Jolynn Pulliam NP    metronidazole IVPB 500 mg, 500 mg, Intravenous, On Call Procedure **AND** gentamicin (GARAMYCIN) 5 mg/kg in sodium chloride 0.9% 100 mL IVPB, 5 mg/kg (Adjusted), Intravenous, On Call Procedure, Jolynn Pulliam NP    LIDOcaine (PF) 10 mg/ml (1%) injection 10 mg, 1 mL, Intradermal, On Call Procedure, Jolynn Pulliam NP    mupirocin 2 % ointment 1 g, 1 g, Nasal, On Call Procedure, Jolynn Pulliam NP    scopolamine 1.3-1.5 mg (1 mg over 3 days) 1 patch, 1 patch, Transdermal, Once, Kala Valero MD    No current facility-administered medications on file prior to encounter.     Current Outpatient Medications on File Prior to Encounter   Medication Sig Dispense Refill    ascorbic acid, vitamin C, (VITAMIN C) 1000 MG tablet Take 1,000 mg by mouth once daily.      B6-folic-B12-coffee-phosphatid 1.7 mg-400 mcg- 2.4 mcg Cap Take by mouth.      diclofenac (VOLTAREN) 50 MG EC tablet TAKE 1 TABLET TWICE DAILY (Patient taking differently: 100 mg. Once every 3 days) 180 tablet 0     fenofibrate (TRICOR) 145 MG tablet Take 1 tablet by mouth once daily. (Patient taking differently: Take 145 mg by mouth every evening.) 30 tablet 3    levothyroxine (SYNTHROID) 75 MCG tablet Take 75 mcg by mouth.      losartan-hydrochlorothiazide 100-25 mg (HYZAAR) 100-25 mg per tablet Take 1 tablet by mouth once daily.      metroNIDAZOLE (FLAGYL) 500 MG tablet Take 500 mg by mouth every 12 (twelve) hours.      multivitamin capsule Take 1 capsule by mouth once daily.      omeprazole (PRILOSEC) 40 MG capsule Take 1 capsule (40 mg total) by mouth once daily. 90 capsule 3    topiramate (TOPAMAX) 50 MG tablet Take 1 tablet (50 mg total) by mouth 2 (two) times daily. 90 tablet 3    vitamin E 400 UNIT capsule Take 400 Units by mouth once daily.      ciprofloxacin HCl (CIPRO) 500 MG tablet Take 500 mg by mouth 2 (two) times daily.      lisinopril-hydrochlorothiazide (PRINZIDE,ZESTORETIC) 10-12.5 mg per tablet Take 1 tablet by mouth once daily. (Patient not taking: Reported on 1/16/2024) 90 tablet 3    tramadol (ULTRAM) 50 mg tablet TAKE ONE TABLET BY MOUTH THREE TIMES DAILY AS NEEDED (Patient not taking: Reported on 1/16/2024) 90 tablet 3         Objective:     PHYSICAL EXAM:  Vital Signs (Most Recent)     There were no vitals filed for this visit.     ROS A 10+ review of systems was performed with pertinent positives and negatives noted above in the history of present illness.  Other systems were negative unless otherwise specified.    Physical Exam  Vitals and nursing note reviewed.   Constitutional:       Appearance: Normal appearance.   HENT:      Head: Normocephalic and atraumatic.   Cardiovascular:      Rate and Rhythm: Normal rate.   Pulmonary:      Effort: Pulmonary effort is normal.   Abdominal:      General: Abdomen is flat.      Palpations: Abdomen is soft.   Skin:     General: Skin is warm and dry.      Capillary Refill: Capillary refill takes less than 2 seconds.   Neurological:      Mental Status: She is  alert and oriented to person, place, and time.   Psychiatric:         Mood and Affect: Mood normal.         Behavior: Behavior normal.          Pathology- reviewed    Specimens (From admission, onward)      None                 Pertinent imaging/labs:   CT abdomen pelvis 12/14/23:  Impression:     Mostly decompressed gallbladder and wall thickening with area of the gallbladder fundus closely approximating portion of the hepatic flexure.  No other secondary inflammatory changes otherwise noted.  Given accompanying pneumobilia and reported pin-hole opening at the hepatic flexure by colonoscopy, a cholecysto-colonic fistula is certainly a consideration.  Recommend further correlation with ERCP.     Hepatic steatosis and additional findings as above.     Left lower lobe 0.6 cm pulmonary nodule, technically indeterminate.  Follow-up CT in 6-12 months can be obtained to ensure stability.      Assessment:     68 y.o. female with cholecysto-colonic fistula. We explained that there can be two approaches to this: conservative measures versus surgical intervention. Given the colon's abnormal connection to the biliary system, she is at risk for development of cholangitis.  Alternatively, resection was offered.  This could be performed laparoscopic with possible conversion to open if there was significant amount of adhesions and scar tissue in the right upper quadrant.  It is likely that her gallbladder is densely adherent to the hepatic flexure.  Will plan for laparoscopic mobilization of the right colon followed by a right upper quadrant extraction to be overlying the gallbladder.  Ideally, we will be able to dissect out the cystic duct laparoscopically for better visualization.  We discussed the risks of the surgery to include need for open surgery, pain, 3-4 day hospital stay, anastomotic leak from the colon anastomosis, biliary leak from the gallbladder work, 4-6 week total recovery.  She wished to proceed with resection.  Message was sent to the Surgical Oncology team for assistance with the gallbladder.    Plan:     - Proceed to OR today for laparoscopic possible open R colectomy, cholecystectomy    Lesli Arriaga MD   [facility]

## 2024-01-24 NOTE — OP NOTE
DALILA ALONSO  [id number]  [id number]    OPERATIVE NOTE:    DATE OF OPERATION: 01/24/2024    PREOPERATIVE DIAGNOSIS:  Cholecysto colonic fistula    POSTOPERATIVE DIAGNOSIS:  Cholecysto colonic fistula    PROCEDURE PERFORMED: Laparoscopic right colectomy.  Laparoscopic cholecystectomy performed by Surgical Oncology    ATTENDING SURGEON: DAI Benitez MD    ASSISTING SURGEON:  Kingsley Padilla MD    ANESTHESIA:  General    ESTIMATED BLOOD LOSS:  100 mL    FINDINGS:  1. Contracted gallbladder with fistula from the hepatic flexure to the dome of the gallbladder.  Fistula was divided with a stapler.  Cholecystectomy was performed by Surgical Oncology.  We then performed laparoscopic right hemicolectomy with stapled side-to-side ileal to transverse colon anastomosis    SPECIMENS:   Gallbladder  Right colon    COMPLICATIONS:  None apparent    DRAINS: none    DISPOSITION: PACU    CONDITION: good    INDICATION FOR PROCEDURE:  Dalila Alonso is a 68 y.o. female who presented with a cholecysto colonic fistula.  She presented for elective resection.    DESCRIPTION OF PROCEDURE:    After informed consent was reviewed, the patient was taken to the Operating Room and placed under general anesthesia.  A Jenkins catheter was sterilely inserted.  Preoperative antibiotics with gentamicin and Flagyl were given.  The arms were tucked and the anterior abdominal wall was prepped and draped in the usual sterile fashion.  A time out was perfomed.     The abdomen was entered through a 5 mm left subcostal incision.  Veress needle was inserted and pneumoperitoneum was achieved.  A 5 mm port and camera were then inserted at the umbilicus.  The underlying bowel and vasculature were inspected and found to be free from any iatrogenic injury.  Diagnostic laparoscopy was then performed.  The omentum was very bulky.  Adhesions were seen in the right upper quadrant.  5 mm trocars were then placed with 2 on the right side.  A window was made  inferior to the transverse colon to gallbladder fistula.  The omentum was also densely adherent it was carefully freed with the ligature.  The fistula was then isolated and divided with a NERISSA 60 mm stapler with a green load.  Two loads were required in order to successfully divide the fistula.  Surgical Oncology then performed laparoscopic cholecystectomy.  The gallbladder was placed into an Endo-Catch bag.   The right colon was then addressed.  A medial to lateral dissection was then performed.  The avascular plane was then developed just posterior to the ileocolic pedicle above the retroperitoneum.  The   duodenum was seen and protected.  A window was made on the duodenal side of the ileocolic pedicle and high ligation of the ileocolic artery was performed.  The pedicle was then grasped and further medial to lateral dissection was performed.  The mesentery of the terminal ileum was mobilized up to the 3rd portion the duodenum.  Lateral attachments were taken down using the LigaSure. The lateral peritoneal attachments were taken up to the hepatic flexure.  The patient was then placed into reverse Trendelenburg position.  The omentum was taken off the transverse colon.  Dissection continued over to take down the hepatic flexure attachments with the LigaSure.  Care was taken to avoid injury to the underlying duodenum/    With the right colon mobilized as a midline structure, a 6 cm right upper quadrant extraction site was then made through the previous port.  The skin was incised in transverse fashion.  The anterior rectus fascia was incised in transverse fashion. Part of the rectus muscle was divided.  The posterior rectus fascia was incised in transverse fashion and a medium Jefe was then placed.  The right colon was then delivered through the medium Jefe.  The terminal ileum elevated.  Approximately 15 cm from the ileocecal valve, a window was made in the terminal ileum mesentery.  The mesentery of the  terminal ileum was divided using the Ligasure to connect to the prior mesenteric dissection at the ileocolic vessel.  The right branch of the middle colic artery was isolated and was divided.  The colon was isolated at the level of the left branch of the middle colic artery. The intervening mesentery was divided with the LigaSure.   There was a significant amount of omental adhesions that were divided.  Part of the omentum was resected in order to gain better visualization.  The ileum and transverse colon were then aligned.  Enterotomies were made on the antimesenteric border and a NERISSA 100 mm stapler with a blue load was inserted and fired to form a common channel.  The common channel was inspected and found to be hemostatic.  Staple lines were then offset and a second firing of the NERISSA 100 mm stapler with a blue load was used to come across transversely to close the enterotomy as well as amputate the specimen.     Two 3-0 Vicryls were then placed at the apex for reinforcement.  The transverse staple line was oversewn with 3-0 Vicryls in simple fashion for hemostasis.   Anterior staple line was oversewn with 3-0 Vicryl in Lembert fashion.   The anastomosis was then placed back into the abdomen.  The Jefe was occluded and the abdomen was reinspected.  Hemostasis was noted.  The omentum was grasped and placed over the anastomosis.  In the left upper quadrant, the 12 mm trocar site was closed with 2 interrupted 0 Vicryl using a suture Passer.  The remainder of the ports removed under direct visualization.    All members of the team then changed gowns and gloves for clean closure.  The right upper quadrant extraction site was closed in layers.  The anterior and posterior fasciae were closed individually with #1 running PDS.  All skin and subcutaneous tissues were irrigated.  All skin incisions were closed with 4-0 Vicryl in subcuticular fashion and Steri-Strips were applied.    The patient tolerated the procedure  well.  There were no apparent intraoperative complications.  All sponge, needle, and instrument counts were correct x2.  The patient was extubated and taken to PACU in stable condition.    DAI Benitez MD, FACS, FASCRS  Staff Surgeon  Colon & Rectal Surgery

## 2024-01-24 NOTE — BRIEF OP NOTE
[facility] - Surgery ([facility])  Brief Operative Note    SUMMARY     Surgery Date: 1/24/2024     Surgeon(s) and Role:  Panel 1:     * DAI Benitez MD - Primary     * Kingsley Jules MD - Resident - Assisting  Panel 2:     * Fernando Watkins MD - Primary     * Naveed Young MD - Resident - Assisting        Pre-op Diagnosis:  Colonic fistula [K63.2]    Post-op Diagnosis:  Post-Op Diagnosis Codes:     * Colonic fistula [K63.2]    Procedure(s) (LRB):  COLECTOMY, RIGHT, LAPAROSCOPIC, ERAS low (N/A)  CHOLECYSTECTOMY, LAPAROSCOPIC (N/A)    Anesthesia: General    Implants:  * No implants in log *    Operative Findings:   - Cholecysto-colonic fistula. Fistula was taken down.   - Lap cholecystectomy, contracted gallbladder.     Estimated Blood Loss: * No values recorded between 1/24/2024 12:26 PM and 1/24/2024  1:49 PM *    Estimated Blood Loss has been documented.               [id number]

## 2024-01-24 NOTE — BRIEF OP NOTE
[facility]u - Surgery ([facility])  Brief Operative Note    SUMMARY     Surgery Date: 1/24/2024     Surgeon(s) and Role:  Panel 1:     * DAI Valdovinos MD - Primary     * Kingsley Jules MD - Resident - Assisting  Panel 2:     * Denyn Watkins MD - Primary     * Naveed Young MD - Resident - Assisting        Pre-op Diagnosis:  Colonic fistula [K63.2]    Post-op Diagnosis:  Post-Op Diagnosis Codes:     * Colonic fistula [K63.2]    Procedure(s) (LRB):  COLECTOMY, RIGHT, LAPAROSCOPIC, ERAS low (N/A)  CHOLECYSTECTOMY, LAPAROSCOPIC (N/A)    Anesthesia: General    Implants:  * No implants in log *    Operative Findings: Transverse colon to gallbladder fistula. Colon was stapled off and cholecystectomy completed by Dr. Watkins - see his op report for details. Right hemicolectomy completed with ligation of ileocolic and right branch of middle colic. Stapled side to side anastomosis completed.     Estimated Blood Loss: 50mL    Estimated Blood Loss has been documented.         Specimens:   Specimen (24h ago, onward)       Start     Ordered    01/24/24 1422  Specimen to Pathology, Surgery Gastrointestinal tract  Once        Comments: Pre-op Diagnosis: Colonic fistula [K63.2]Procedure(s):COLECTOMY, RIGHT, LAPAROSCOPIC, ERAS lowCHOLECYSTECTOMY, LAPAROSCOPIC Number of specimens: 2Name of specimens: 1.) Gallbladder and fistula to colon - permanent, 2.) Right colon and omentum - permanent.     References:    Click here for ordering Quick Tip   Question Answer Comment   Procedure Type: Gastrointestinal tract    Specimen Class: Routine/Screening    Which provider would you like to cc? DENNY WATKINS    Which provider would you like to cc? DAI VALDOVINOS        01/24/24 1453                    [id number]

## 2024-01-24 NOTE — ANESTHESIA PROCEDURE NOTES
Intubation    Date/Time: 1/24/2024 12:10 PM    Performed by: Kala Valero MD  Authorized by: Adela More MD    Intubation:     Induction:  Intravenous    Intubated:  Postinduction    Mask Ventilation:  Easy mask    Attempts:  1    Attempted By:  Resident anesthesiologist    Method of Intubation:  Direct    Blade:  Lan 2    Laryngeal View Grade: Grade IIA - cords partially seen      Difficult Airway Encountered?: No      Complications:  None    Airway Device:  Oral endotracheal tube    Airway Device Size:  7.0    Style/Cuff Inflation:  Cuffed (inflated to minimal occlusive pressure)    Tube secured:  21    Secured at:  The lips    Placement Verified By:  Capnometry    Complicating Factors:  Small mouth, obesity and short neck    Findings Post-Intubation:  BS equal bilateral and atraumatic/condition of teeth unchanged

## 2024-01-24 NOTE — INTERVAL H&P NOTE
"The patient has been examined and the H&P has been reviewed:    I concur with the findings and no changes have occurred since H&P was written.    /62 (BP Location: Right arm, Patient Position: Lying)   Pulse 72   Temp 98.1 °F (36.7 °C) (Oral)   Resp 16   Ht 5' 1" (1.549 m)   Wt 105.7 kg (233 lb)   LMP  (LMP Unknown)   SpO2 98%   Breastfeeding No   BMI 44.02 kg/m²     General: NAD  Neuro: AAOx4  Cardio: S1 and S2, RRR  Resp: Moving air appropriately, breathing even and unlabored  Abd: Soft, NT, ND  Ext: Warm and well perfused      Surgery risks, benefits and alternative options discussed and understood by patient/family.          There are no hospital problems to display for this patient.    "

## 2024-01-24 NOTE — H&P
Colon and Rectal Surgery History and Physical    Patient name: Danyell Mathias   YOB: 1955   MRN: [id number]    Subjective:       Patient ID: Danyell Mathias is a 68 y.o. female.    Chief Complaint: cholecysto-colonic fistula  HPI  Patient is a 68 y.o. female presents with cholecysto-colonic fistula. She reports a remote history of gallstones and a provider recommending her having her gallbladder out in the past, but she never sought treatment. She denies recurrent cholecystitis or bouts of biliary colic. She has no right upper quadrant abdominal pain. Previous hysterectomy through pfannenstiel incision.  No other abdominal surgeries.  Functionally, she is active and healthy.  Nondiabetic.  No heart disease.  She is daily bowel movements.  No recent change in her weight.  No issues with fecal incontinence.  Does have a significant family history of colon cancer in her father.     Colonoscopy done on 12/13/23 for + FIT test.  Found to have a colonic fistula.   CT abd pelvis: 12/14/23:  demonstrates a diminutive gallbladder with a fistula tract within the hepatic flexure of the colon; there is pneumobilia throughout the biliary ductal system     Last Colonoscopy: 12/13/2023:   Impression:            - Non-bleeding internal hemorrhoids.                          - Suspected Colonic fistula with surrounding                          irregular tissue, biopsied and tattoo placed just                          distal..                          - Diverticulosis in the sigmoid colon and in the                          descending colon.                          - The examination was otherwise normal on direct                          and retroflexion views.                          There is what appears to be pinhole opening with                          irregular mucosa, i suspect at the hepatic                          flexure, tattooed just distal, biopsied.         Review of patient's allergies indicates:    Allergen Reactions    Latex, natural rubber Other (See Comments)     Blisters      Macrolide antibiotics Nausea And Vomiting     Erythromycin      Penicillins Itching    Sulfa (sulfonamide antibiotics) Itching       Past Medical History:   Diagnosis Date    Allergy     Arthritis     Encounter for blood transfusion     Gastric ulcer     Hypertension     Migraine headache        Past Surgical History:   Procedure Laterality Date    COLONOSCOPY N/A 12/13/2023    Procedure: COLONOSCOPY;  Surgeon: Aman Hooks MD;  Location: [facility];  Service: Endoscopy;  Laterality: N/A;    HYSTERECTOMY      TONSILLECTOMY         Current Facility-Administered Medications   Medication Dose Route Frequency Provider Last Rate Last Admin    0.9%  NaCl infusion   Intravenous On Call Procedure Jolynn Pulliam NP        metronidazole IVPB 500 mg  500 mg Intravenous On Call Procedure Jolynn Pulliam  mL/hr at 01/24/24 1053 500 mg at 01/24/24 1053    And    gentamicin (GARAMYCIN) 355.2 mg in sodium chloride 0.9% 100 mL IVPB  5 mg/kg (Adjusted) Intravenous On Call Procedure Jolynn Pulliam NP        LIDOcaine (PF) 10 mg/ml (1%) injection 10 mg  1 mL Intradermal On Call Procedure Jolynn Pulliam NP        scopolamine 1.3-1.5 mg (1 mg over 3 days) 1 patch  1 patch Transdermal Once Kala Valero MD   1 patch at 01/24/24 1039       Family History   Problem Relation Age of Onset    Cancer Mother     Rheum arthritis Mother     Heart disease Mother     Cancer Father 74        colon    Heart failure Father     Stroke Father     Alzheimer's disease Father     No Known Problems Sister     No Known Problems Daughter     No Known Problems Son        Social History     Socioeconomic History    Marital status:     Years of education: college   Tobacco Use    Smoking status: Never    Smokeless tobacco: Never   Substance and Sexual Activity    Alcohol use: No     Alcohol/week: 0.0 standard drinks of alcohol    Drug  "use: No       Review of Systems   Constitutional:  Negative for chills and fever.   HENT:  Negative for congestion.    Respiratory:  Negative for chest tightness and shortness of breath.    Cardiovascular:  Negative for chest pain.   Gastrointestinal:  Positive for abdominal pain. Negative for abdominal distention, constipation and diarrhea.   Genitourinary:  Negative for dysuria.   Musculoskeletal:  Negative for arthralgias and back pain.   Neurological:  Negative for dizziness and numbness.       Objective:      Physical Exam      Physical Exam:  /62 (BP Location: Right arm, Patient Position: Lying)   Pulse 72   Temp 98.1 °F (36.7 °C) (Oral)   Resp 16   Ht 5' 1" (1.549 m)   Wt 105.7 kg (233 lb)   LMP  (LMP Unknown)   SpO2 98%   Breastfeeding No   BMI 44.02 kg/m²   General: no distress  Head: normocephalic  Neck: supple, symmetrical, trachea midline  Lungs:  clear to auscultation bilaterally and normal respiratory effort  Heart: regular rate and rhythm and no murmur  Abdomen: soft, non-tender non-distented; bowel sounds normal; no masses,  no organomegaly  Extremities: no cyanosis or edema, or clubbing    Laboratory Studies:  Complete Blood Count:  Lab Results   Component Value Date/Time    WBC 7.52 11/08/2022 12:52 PM    HGB 12.5 11/08/2022 12:52 PM    HCT 39.5 11/08/2022 12:52 PM    RBC 4.11 11/08/2022 12:52 PM     11/08/2022 12:52 PM       Basic Chemistry Panel:  Lab Results   Component Value Date/Time     11/08/2022 12:52 PM    K 4.5 11/08/2022 12:52 PM     11/08/2022 12:52 PM    CO2 21 (L) 11/08/2022 12:52 PM    BUN 36 (H) 11/08/2022 12:52 PM    CREATININE 1.2 12/14/2023 12:05 PM    CALCIUM 10.0 11/08/2022 12:52 PM       Lab Results   Component Value Date/Time    CRP 5.3 12/20/2016 11:32 AM           Assessment:       1. Colon cancer      69yo female who presents with cholecysto-colonic fistula   Plan:       To OR for laparoscopic possible open right colectomy and " cholecystectomy  Discussed risks, benefits and alternatives to surgery and Ms. Mathias is agreeable to the procedure.     Kingsley Jules MD  Colon & Rectal Fellow

## 2024-01-25 PROBLEM — E66.01 SEVERE OBESITY (BMI >= 40): Status: ACTIVE | Noted: 2024-01-25

## 2024-01-25 LAB
ALBUMIN SERPL BCP-MCNC: 3.4 G/DL (ref 3.5–5.2)
ALP SERPL-CCNC: 64 U/L (ref 55–135)
ALT SERPL W/O P-5'-P-CCNC: 80 U/L (ref 10–44)
ANION GAP SERPL CALC-SCNC: 9 MMOL/L (ref 8–16)
AST SERPL-CCNC: 73 U/L (ref 10–40)
BASOPHILS # BLD AUTO: 0.01 K/UL (ref 0–0.2)
BASOPHILS NFR BLD: 0.1 % (ref 0–1.9)
BILIRUB SERPL-MCNC: 0.5 MG/DL (ref 0.1–1)
BUN SERPL-MCNC: 22 MG/DL (ref 8–23)
CALCIUM SERPL-MCNC: 8.3 MG/DL (ref 8.7–10.5)
CHLORIDE SERPL-SCNC: 108 MMOL/L (ref 95–110)
CO2 SERPL-SCNC: 22 MMOL/L (ref 23–29)
CREAT SERPL-MCNC: 1.3 MG/DL (ref 0.5–1.4)
DIFFERENTIAL METHOD BLD: ABNORMAL
EOSINOPHIL # BLD AUTO: 0 K/UL (ref 0–0.5)
EOSINOPHIL NFR BLD: 0.1 % (ref 0–8)
ERYTHROCYTE [DISTWIDTH] IN BLOOD BY AUTOMATED COUNT: 13.1 % (ref 11.5–14.5)
EST. GFR  (NO RACE VARIABLE): 44.2 ML/MIN/1.73 M^2
GLUCOSE SERPL-MCNC: 114 MG/DL (ref 70–110)
HCT VFR BLD AUTO: 34.2 % (ref 37–48.5)
HGB BLD-MCNC: 11.2 G/DL (ref 12–16)
IMM GRANULOCYTES # BLD AUTO: 0.05 K/UL (ref 0–0.04)
IMM GRANULOCYTES NFR BLD AUTO: 0.4 % (ref 0–0.5)
LYMPHOCYTES # BLD AUTO: 0.8 K/UL (ref 1–4.8)
LYMPHOCYTES NFR BLD: 6.1 % (ref 18–48)
MAGNESIUM SERPL-MCNC: 1.8 MG/DL (ref 1.6–2.6)
MCH RBC QN AUTO: 30.5 PG (ref 27–31)
MCHC RBC AUTO-ENTMCNC: 32.7 G/DL (ref 32–36)
MCV RBC AUTO: 93 FL (ref 82–98)
MONOCYTES # BLD AUTO: 1 K/UL (ref 0.3–1)
MONOCYTES NFR BLD: 8.3 % (ref 4–15)
NEUTROPHILS # BLD AUTO: 10.4 K/UL (ref 1.8–7.7)
NEUTROPHILS NFR BLD: 85 % (ref 38–73)
NRBC BLD-RTO: 0 /100 WBC
PHOSPHATE SERPL-MCNC: 3.5 MG/DL (ref 2.7–4.5)
PLATELET # BLD AUTO: 330 K/UL (ref 150–450)
PMV BLD AUTO: 10.4 FL (ref 9.2–12.9)
POTASSIUM SERPL-SCNC: 3.8 MMOL/L (ref 3.5–5.1)
PROT SERPL-MCNC: 6.3 G/DL (ref 6–8.4)
RBC # BLD AUTO: 3.67 M/UL (ref 4–5.4)
SODIUM SERPL-SCNC: 139 MMOL/L (ref 136–145)
WBC # BLD AUTO: 12.24 K/UL (ref 3.9–12.7)

## 2024-01-25 PROCEDURE — 20600001 HC STEP DOWN PRIVATE ROOM

## 2024-01-25 PROCEDURE — 83735 ASSAY OF MAGNESIUM: CPT | Performed by: STUDENT IN AN ORGANIZED HEALTH CARE EDUCATION/TRAINING PROGRAM

## 2024-01-25 PROCEDURE — 80053 COMPREHEN METABOLIC PANEL: CPT | Performed by: SURGERY

## 2024-01-25 PROCEDURE — 25000003 PHARM REV CODE 250: Performed by: STUDENT IN AN ORGANIZED HEALTH CARE EDUCATION/TRAINING PROGRAM

## 2024-01-25 PROCEDURE — 85025 COMPLETE CBC W/AUTO DIFF WBC: CPT | Performed by: STUDENT IN AN ORGANIZED HEALTH CARE EDUCATION/TRAINING PROGRAM

## 2024-01-25 PROCEDURE — 63600175 PHARM REV CODE 636 W HCPCS: Performed by: STUDENT IN AN ORGANIZED HEALTH CARE EDUCATION/TRAINING PROGRAM

## 2024-01-25 PROCEDURE — 36415 COLL VENOUS BLD VENIPUNCTURE: CPT | Performed by: STUDENT IN AN ORGANIZED HEALTH CARE EDUCATION/TRAINING PROGRAM

## 2024-01-25 PROCEDURE — 84100 ASSAY OF PHOSPHORUS: CPT | Performed by: STUDENT IN AN ORGANIZED HEALTH CARE EDUCATION/TRAINING PROGRAM

## 2024-01-25 RX ADMIN — ACETAMINOPHEN 1000 MG: 10 INJECTION, SOLUTION INTRAVENOUS at 04:01

## 2024-01-25 RX ADMIN — GABAPENTIN 300 MG: 300 CAPSULE ORAL at 08:01

## 2024-01-25 RX ADMIN — IBUPROFEN 800 MG: 800 INJECTION INTRAVENOUS at 03:01

## 2024-01-25 RX ADMIN — TOPIRAMATE 50 MG: 25 TABLET, FILM COATED ORAL at 08:01

## 2024-01-25 RX ADMIN — IBUPROFEN 800 MG: 400 TABLET ORAL at 08:01

## 2024-01-25 RX ADMIN — TRAMADOL HYDROCHLORIDE 50 MG: 50 TABLET, COATED ORAL at 05:01

## 2024-01-25 RX ADMIN — PANTOPRAZOLE SODIUM 40 MG: 40 TABLET, DELAYED RELEASE ORAL at 09:01

## 2024-01-25 RX ADMIN — ALVIMOPAN 12 MG: 12 CAPSULE ORAL at 09:01

## 2024-01-25 RX ADMIN — ENOXAPARIN SODIUM 40 MG: 40 INJECTION SUBCUTANEOUS at 04:01

## 2024-01-25 RX ADMIN — IBUPROFEN 800 MG: 800 INJECTION INTRAVENOUS at 07:01

## 2024-01-25 RX ADMIN — MUPIROCIN: 20 OINTMENT TOPICAL at 08:01

## 2024-01-25 RX ADMIN — TOPIRAMATE 50 MG: 25 TABLET, FILM COATED ORAL at 09:01

## 2024-01-25 RX ADMIN — ACETAMINOPHEN 1000 MG: 10 INJECTION, SOLUTION INTRAVENOUS at 05:01

## 2024-01-25 RX ADMIN — LEVOTHYROXINE SODIUM 75 MCG: 75 TABLET ORAL at 05:01

## 2024-01-25 RX ADMIN — ALVIMOPAN 12 MG: 12 CAPSULE ORAL at 08:01

## 2024-01-25 RX ADMIN — ACETAMINOPHEN 1000 MG: 500 TABLET ORAL at 08:01

## 2024-01-25 RX ADMIN — MUPIROCIN: 20 OINTMENT TOPICAL at 09:01

## 2024-01-25 RX ADMIN — TRAMADOL HYDROCHLORIDE 50 MG: 50 TABLET, COATED ORAL at 01:01

## 2024-01-25 RX ADMIN — FENOFIBRATE 145 MG: 145 TABLET, FILM COATED ORAL at 08:01

## 2024-01-25 NOTE — ANESTHESIA POSTPROCEDURE EVALUATION
Anesthesia Post Evaluation    Patient: Danyell Mathias    Procedure(s) Performed: Procedure(s) (LRB):  COLECTOMY, RIGHT, LAPAROSCOPIC, ERAS low (N/A)  CHOLECYSTECTOMY, LAPAROSCOPIC (N/A)    Final Anesthesia Type: general      Patient location during evaluation: PACU  Patient participation: Yes- Able to Participate  Level of consciousness: awake and alert  Post-procedure vital signs: reviewed and stable  Pain management: adequate  Airway patency: patent    PONV status at discharge: No PONV  Anesthetic complications: no      Cardiovascular status: blood pressure returned to baseline and hemodynamically stable  Respiratory status: spontaneous ventilation  Hydration status: euvolemic  Follow-up not needed.              Vitals Value Taken Time   /61 01/25/24 0352   Temp 36.9 °C (98.5 °F) 01/25/24 0352   Pulse 76 01/25/24 0352   Resp 18 01/25/24 0505   SpO2 92 % 01/25/24 0352         Event Time   Out of Recovery 16:00:00         Pain/Miguelina Score: Pain Rating Prior to Med Admin: 3 (1/25/2024  5:05 AM)  Pain Rating Post Med Admin: 0 (1/25/2024  5:36 AM)  Miguelina Score: 9 (1/24/2024  4:00 PM)

## 2024-01-25 NOTE — SUBJECTIVE & OBJECTIVE
Subjective:     Interval History: No acute events overnight. POD 1 from lap R colectomy and cholecystectomy for cholecystocolonic fistula. Doing well this morning. Pain well controlled. Tolerated clear liquids without nausea/vomiting.     Post-Op Info:  Procedure(s) (LRB):  COLECTOMY, RIGHT, LAPAROSCOPIC, ERAS low (N/A)  CHOLECYSTECTOMY, LAPAROSCOPIC (N/A)   1 Day Post-Op      Medications:  Continuous Infusions:   sodium chloride 0.9% 40 mL/hr at 01/24/24 1520    sodium chloride 0.9%       Scheduled Meds:   acetaminophen  1,000 mg Intravenous Q8H    Followed by    acetaminophen  1,000 mg Oral Q8H    alvimopan  12 mg Oral BID    enoxparin  40 mg Subcutaneous Daily    fenofibrate  145 mg Oral QHS    gabapentin  300 mg Oral TID    ibuprofen  800 mg Intravenous Q8H    Followed by    ibuprofen  800 mg Oral Q8H    levothyroxine  75 mcg Oral Before breakfast    mupirocin   Nasal BID    nitroGLYCERIN  0.4 mg Sublingual 1 time in Clinic/HOD    pantoprazole  40 mg Oral Daily    topiramate  50 mg Oral BID     PRN Meds:   ondansetron    oxyCODONE    oxyCODONE    sodium chloride 0.9%    traMADoL        Objective:     Vital Signs (Most Recent):  Temp: 98.5 °F (36.9 °C) (01/25/24 0352)  Pulse: 76 (01/25/24 0352)  Resp: 18 (01/25/24 0505)  BP: 109/61 (01/25/24 0352)  SpO2: (!) 92 % (01/25/24 0352) Vital Signs (24h Range):  Temp:  [97.7 °F (36.5 °C)-98.5 °F (36.9 °C)] 98.5 °F (36.9 °C)  Pulse:  [72-94] 76  Resp:  [12-20] 18  SpO2:  [92 %-100 %] 92 %  BP: (109-137)/(58-69) 109/61     Intake/Output - Last 3 Shifts         01/23 0700  01/24 0659 01/24 0700 01/25 0659 01/25 0700  01/26 0659    P.O.  300     I.V. (mL/kg)  1580 (15)     IV Piggyback  942.2     Total Intake(mL/kg)  2822.2 (26.9)     Urine (mL/kg/hr)  1105     Stool  0     Total Output  1105     Net  +1717.2            Stool Occurrence  0 x              Physical Exam  Constitutional:       General: She is not in acute distress.     Appearance: Normal appearance.   HENT:  "     Head: Normocephalic and atraumatic.   Cardiovascular:      Rate and Rhythm: Normal rate.   Pulmonary:      Effort: Pulmonary effort is normal. No respiratory distress.   Abdominal:      General: Abdomen is flat. There is no distension.      Palpations: Abdomen is soft.      Comments: Incisions c/d/I, small hematoma developed under one of the L lateral incisions   Skin:     General: Skin is warm and dry.   Neurological:      General: No focal deficit present.      Mental Status: She is alert and oriented to person, place, and time.   Psychiatric:         Mood and Affect: Mood normal.         Behavior: Behavior normal.                Significant Labs:  BMP:   Recent Labs   Lab 01/25/24  0409   *      K 3.8      CO2 22*   BUN 22   CREATININE 1.3   CALCIUM 8.3*   MG 1.8     CMP (Last 3 Results):   Recent Labs   Lab 01/24/24  1640 01/25/24  0409   GLU  --  114*   CALCIUM  --  8.3*   ALBUMIN  --  3.4*   PROT  --  6.3   NA  --  139   K  --  3.8   CO2  --  22*   CL  --  108   BUN  --  22   CREATININE 1.4 1.3   ALKPHOS  --  64   ALT  --  80*   AST  --  73*   BILITOT  --  0.5     CRP: No results for input(s): "CRP" in the last 168 hours.    Significant Diagnostics:  I have reviewed all pertinent imaging results/findings within the past 24 hours.  "

## 2024-01-25 NOTE — ASSESSMENT & PLAN NOTE
68 y.o female s/p cholecysto-colic fistula takedown on 1/24/24.     - Doing well  - Diet per CRS  - DVT prophylaxis  - Encourage ambulation  - Encourage IS   - Multimodal pain control    Dispo: Per CRS

## 2024-01-25 NOTE — SUBJECTIVE & OBJECTIVE
Interval History: No acute events overnight, adequate pain control. No nausea or emesis. Mild elevation of liver enzymes, expected.     Medications:  Continuous Infusions:   sodium chloride 0.9% 40 mL/hr at 01/24/24 1520    sodium chloride 0.9%       Scheduled Meds:   acetaminophen  1,000 mg Intravenous Q8H    Followed by    acetaminophen  1,000 mg Oral Q8H    alvimopan  12 mg Oral BID    enoxparin  40 mg Subcutaneous Daily    fenofibrate  145 mg Oral QHS    gabapentin  300 mg Oral TID    ibuprofen  800 mg Intravenous Q8H    Followed by    ibuprofen  800 mg Oral Q8H    levothyroxine  75 mcg Oral Before breakfast    mupirocin   Nasal BID    nitroGLYCERIN  0.4 mg Sublingual 1 time in Clinic/HOD    pantoprazole  40 mg Oral Daily    topiramate  50 mg Oral BID     PRN Meds:ondansetron, oxyCODONE, oxyCODONE, sodium chloride 0.9%, traMADoL     Review of patient's allergies indicates:   Allergen Reactions    Latex, natural rubber Other (See Comments)     Blisters      Macrolide antibiotics Nausea And Vomiting     Erythromycin      Penicillins Itching    Sulfa (sulfonamide antibiotics) Itching     Objective:     Vital Signs (Most Recent):  Temp: 98.5 °F (36.9 °C) (01/25/24 0352)  Pulse: 76 (01/25/24 0352)  Resp: 18 (01/25/24 0505)  BP: 109/61 (01/25/24 0352)  SpO2: (!) 92 % (01/25/24 0352) Vital Signs (24h Range):  Temp:  [97.7 °F (36.5 °C)-98.5 °F (36.9 °C)] 98.5 °F (36.9 °C)  Pulse:  [72-94] 76  Resp:  [12-20] 18  SpO2:  [92 %-100 %] 92 %  BP: (109-137)/(58-69) 109/61     Weight: 105 kg (231 lb 7.7 oz)  Body mass index is 43.74 kg/m².    Intake/Output - Last 3 Shifts         01/23 0700  01/24 0659 01/24 0700 01/25 0659    P.O.  300    I.V. (mL/kg)  1580 (15)    IV Piggyback  942.2    Total Intake(mL/kg)  2822.2 (26.9)    Urine (mL/kg/hr)  1105    Stool  0    Total Output  1105    Net  +1717.2          Stool Occurrence  0 x             Physical Exam  Constitutional:       Appearance: Normal appearance.   Cardiovascular:       Rate and Rhythm: Normal rate.   Pulmonary:      Effort: Pulmonary effort is normal.   Abdominal:      General: Abdomen is flat. There is no distension.      Palpations: Abdomen is soft.      Tenderness: There is no abdominal tenderness.      Comments: Incisions c/d/i   Skin:     Capillary Refill: Capillary refill takes less than 2 seconds.   Neurological:      General: No focal deficit present.      Mental Status: She is alert.          Significant Labs:  CBC:   Recent Labs   Lab 01/25/24  0409   WBC 12.24   RBC 3.67*   HGB 11.2*   HCT 34.2*      MCV 93   MCH 30.5   MCHC 32.7     BMP:   Recent Labs   Lab 01/25/24  0409   *      K 3.8      CO2 22*   BUN 22   CREATININE 1.3   CALCIUM 8.3*   MG 1.8       Significant Diagnostics:  I have reviewed all pertinent imaging results/findings within the past 24 hours.

## 2024-01-25 NOTE — PLAN OF CARE
[facility] [facility] [facility]  Initial Discharge Assessment       Primary Care Provider: Louis Aleman MD    Admission Diagnosis: Colonic fistula [K63.2]  Colon cancer [C18.9]    Admission Date: 1/24/2024  Expected Discharge Date: 1/30/2024    Transition of Care Barriers: None    Payor: [employer] [facility]ARE [facility] / Plan: [employer] CHOICES / Product Type: Medicare Advantage /     Extended Emergency Contact Information  Primary Emergency Contact: Aashish Mathias  Address: [address] [place] [id number] [place]  Mobile Phone: [phone]  Relation: Spouse  Secondary Emergency Contact: Betty Quinn   [place]  Mobile Phone: [phone]  Relation: Daughter    Discharge Plan A: Home Health  Discharge Plan B: Home with [facility] - [place], [place] - [address]  [address] [facility] [address] [facility] [address]  Phone: [phone] Fax: [phone]    [facility] [place] - [address]  [address]  [address]  [place] [address]  Phone: [phone] Fax: [phone]      Initial Assessment (most recent)       Adult Discharge Assessment - 01/25/24 1225          Discharge Assessment    Assessment Type Discharge Planning Assessment     Confirmed/corrected address, phone number and insurance Yes     Confirmed Demographics Correct on Facesheet     Source of Information patient     When was your last doctors appointment? 11/06/23     Does patient/caregiver understand observation status Yes     Communicated MARIE with patient/caregiver Yes     People in Home spouse;child(leno), dependent     Do you expect to return to your current living situation? Yes     Do you have help at home or someone to help you manage your care at home? Yes     Who are your caregiver(s) and their phone number(s)?      Prior to hospitilization cognitive status: Alert/Oriented     Current cognitive status: Alert/Oriented     Do  you have any problems with: Errands/Grocery     Home Accessibility wheelchair accessible     Home Layout Able to live on 1st floor     Do you take prescription medications? Yes     Do you have prescription coverage? Yes     Do you have any problems affording any of your prescribed medications? No     Is the patient taking medications as prescribed? yes     Who is going to help you get home at discharge?      How do you get to doctors appointments? car, drives self     Are you on dialysis? No     Do you take coumadin? No     Discharge Plan A Home Health     Discharge Plan B Home with family     Discharge Plan discussed with: Patient     Transition of Care Barriers None     SDOH --   no       Physical Activity    On average, how many days per week do you engage in moderate to strenuous exercise (like a brisk walk)? 0 days     On average, how many minutes do you engage in exercise at this level? 0 min        Financial Resource Strain    How hard is it for you to pay for the very basics like food, housing, medical care, and heating? Somewhat hard        Housing Stability    In the last 12 months, was there a time when you were not able to pay the mortgage or rent on time? No     In the last 12 months, was there a time when you did not have a steady place to sleep or slept in a shelter (including now)? No        Transportation Needs    In the past 12 months, has lack of transportation kept you from medical appointments or from getting medications? No     In the past 12 months, has lack of transportation kept you from meetings, work, or from getting things needed for daily living? No        Food Insecurity    Within the past 12 months, you worried that your food would run out before you got the money to buy more. Never true     Within the past 12 months, the food you bought just didn't last and you didn't have money to get more. Never true        Stress    Do you feel stress - tense, restless, nervous, or anxious,  or unable to sleep at night because your mind is troubled all the time - these days? Rather much        Social Connections    In a typical week, how many times do you talk on the phone with family, friends, or neighbors? Never     How often do you get together with friends or relatives? Never     How often do you attend Yazdanism or Latter day services? More than 4 times per year     Do you belong to any clubs or organizations such as Yazdanism groups, unions, fraternal or athletic groups, or school groups? No     How often do you attend meetings of the clubs or organizations you belong to? Never     Are you , , , , never , or living with a partner?         Alcohol Use    Q1: How often do you have a drink containing alcohol? Never     Q2: How many drinks containing alcohol do you have on a typical day when you are drinking? Patient does not drink     Q3: How often do you have six or more drinks on one occasion? Never                   The CM met with the patient at bedside to complete the DPA.  The CM placed name and contact information on the blackboard in the patient's room.  Use preferred pharmacy / bedside delivery for any necessary  medications at the time of discharge.The patient is independent with all ADLs. The patient is not on Dialysis or Coumadin. The patient's  will provide assistance to the patient upon discharge. The patient's  will provide transportation upon discharge .  The CM will continue to follow for course of hospitalization.

## 2024-01-25 NOTE — ASSESSMENT & PLAN NOTE
Danyell Mathias is a 68 y.o. female who is now s/p lap R colectomy and cholecystectomy on 1/24/24 for cholecystocolonic fistula with Dr. Benitez & Dr. Watkins.     - Pain control: multimodal  - PRN anti-nausea  - Diet: advance to LRD  - Home medications as appropriate  - Encourage IS, acapella  - DVT ppx   - OOBTC, encourage ambulation    Dispo: Continue inpatient care

## 2024-01-25 NOTE — PROGRESS NOTES
Nurses Note -- 4 Eyes      1/24/2024   7:11 PM      Skin assessed during: Admit      [x] No Altered Skin Integrity Present    []Prevention Measures Documented      [] Yes- Altered Skin Integrity Present or Discovered   [] LDA Added if Not in Epic (Describe Wound)   [] New Altered Skin Integrity was Present on Admit and Documented in LDA   [] Wound Image Taken    Wound Care Consulted? No    Attending Nurse:  Sarina Valera RN/Staff Member:   Jeanine ADRIAN

## 2024-01-25 NOTE — PROGRESS NOTES
Patient just arrived to room, no complaints of pain at this time. Educated on how to use incentive spirometer and demonstrated proper technique at this time, instructed to use 10x every hour while awake. Skin assessment performed. Introduced to room and how to reach staff. VS obtained, VSS

## 2024-01-25 NOTE — CARE UPDATE
I have reviewed the chart of Danyell Mathias and participated in the care of the patient who is hospitalized for the following:    Active Hospital Problems    Diagnosis    *Colonic fistula    Severe obesity (BMI >= 40)          I have reviewed the Danyell Mathias with the multidisciplinary team during rounds.      Jr De La Cruz PA-C  Unit Based [facility]

## 2024-01-25 NOTE — PROGRESS NOTES
[facility]  Colorectal Surgery  Progress Note    Patient Name: Danyell Mathias  MRN: [id number]  Admission Date: 1/24/2024  Hospital Length of Stay: 1 days  Attending Physician: DAI Benitez MD    Subjective:     Interval History: No acute events overnight. POD 1 from lap R colectomy and cholecystectomy for cholecystocolonic fistula. Doing well this morning. Pain well controlled. Tolerated clear liquids without nausea/vomiting.     Post-Op Info:  Procedure(s) (LRB):  COLECTOMY, RIGHT, LAPAROSCOPIC, ERAS low (N/A)  CHOLECYSTECTOMY, LAPAROSCOPIC (N/A)   1 Day Post-Op      Medications:  Continuous Infusions:   sodium chloride 0.9% 40 mL/hr at 01/24/24 1520    sodium chloride 0.9%       Scheduled Meds:   acetaminophen  1,000 mg Intravenous Q8H    Followed by    acetaminophen  1,000 mg Oral Q8H    alvimopan  12 mg Oral BID    enoxparin  40 mg Subcutaneous Daily    fenofibrate  145 mg Oral QHS    gabapentin  300 mg Oral TID    ibuprofen  800 mg Intravenous Q8H    Followed by    ibuprofen  800 mg Oral Q8H    levothyroxine  75 mcg Oral Before breakfast    mupirocin   Nasal BID    nitroGLYCERIN  0.4 mg Sublingual 1 time in Clinic/HOD    pantoprazole  40 mg Oral Daily    topiramate  50 mg Oral BID     PRN Meds:   ondansetron    oxyCODONE    oxyCODONE    sodium chloride 0.9%    traMADoL        Objective:     Vital Signs (Most Recent):  Temp: 98.5 °F (36.9 °C) (01/25/24 0352)  Pulse: 76 (01/25/24 0352)  Resp: 18 (01/25/24 0505)  BP: 109/61 (01/25/24 0352)  SpO2: (!) 92 % (01/25/24 0352) Vital Signs (24h Range):  Temp:  [97.7 °F (36.5 °C)-98.5 °F (36.9 °C)] 98.5 °F (36.9 °C)  Pulse:  [72-94] 76  Resp:  [12-20] 18  SpO2:  [92 %-100 %] 92 %  BP: (109-137)/(58-69) 109/61     Intake/Output - Last 3 Shifts         01/23 0700 01/24 0659 01/24 0700 01/25 0659 01/25 0700 01/26 0659    P.O.  300     I.V. (mL/kg)  1580 (15)     IV Piggyback  942.2     Total Intake(mL/kg)  2822.2 (26.9)     Urine (mL/kg/hr)  1105     Stool  " 0     Total Output  1105     Net  +1717.2            Stool Occurrence  0 x              Physical Exam  Constitutional:       General: She is not in acute distress.     Appearance: Normal appearance.   HENT:      Head: Normocephalic and atraumatic.   Cardiovascular:      Rate and Rhythm: Normal rate.   Pulmonary:      Effort: Pulmonary effort is normal. No respiratory distress.   Abdominal:      General: Abdomen is flat. There is no distension.      Palpations: Abdomen is soft.      Comments: Incisions c/d/I, small hematoma developed under one of the L lateral incisions   Skin:     General: Skin is warm and dry.   Neurological:      General: No focal deficit present.      Mental Status: She is alert and oriented to person, place, and time.   Psychiatric:         Mood and Affect: Mood normal.         Behavior: Behavior normal.                Significant Labs:  BMP:   Recent Labs   Lab 01/25/24  0409   *      K 3.8      CO2 22*   BUN 22   CREATININE 1.3   CALCIUM 8.3*   MG 1.8     CMP (Last 3 Results):   Recent Labs   Lab 01/24/24  1640 01/25/24  0409   GLU  --  114*   CALCIUM  --  8.3*   ALBUMIN  --  3.4*   PROT  --  6.3   NA  --  139   K  --  3.8   CO2  --  22*   CL  --  108   BUN  --  22   CREATININE 1.4 1.3   ALKPHOS  --  64   ALT  --  80*   AST  --  73*   BILITOT  --  0.5     CRP: No results for input(s): "CRP" in the last 168 hours.    Significant Diagnostics:  I have reviewed all pertinent imaging results/findings within the past 24 hours.  Assessment/Plan:     * Colonic fistula  Danyell Mathias is a 68 y.o. female who is now s/p lap R colectomy and cholecystectomy on 1/24/24 for cholecystocolonic fistula with Dr. Benitez & Dr. Watkins.     - Pain control: multimodal  - PRN anti-nausea  - Diet: advance to LRD  - Home medications as appropriate  - Encourage IS, acapella  - DVT ppx   - OOBTC, encourage ambulation    Dispo: Continue inpatient care            Lesli Arriaga MD  Colorectal " Surgery  [facility]

## 2024-01-25 NOTE — PROGRESS NOTES
[facility]  General Surgery  Progress Note    Subjective:     History of Present Illness:  No notes on file    Post-Op Info:  Procedure(s) (LRB):  COLECTOMY, RIGHT, LAPAROSCOPIC, ERAS low (N/A)  CHOLECYSTECTOMY, LAPAROSCOPIC (N/A)   1 Day Post-Op     Interval History: No acute events overnight, adequate pain control. No nausea or emesis. Mild elevation of liver enzymes, expected.     Medications:  Continuous Infusions:   sodium chloride 0.9% 40 mL/hr at 01/24/24 1520    sodium chloride 0.9%       Scheduled Meds:   acetaminophen  1,000 mg Intravenous Q8H    Followed by    acetaminophen  1,000 mg Oral Q8H    alvimopan  12 mg Oral BID    enoxparin  40 mg Subcutaneous Daily    fenofibrate  145 mg Oral QHS    gabapentin  300 mg Oral TID    ibuprofen  800 mg Intravenous Q8H    Followed by    ibuprofen  800 mg Oral Q8H    levothyroxine  75 mcg Oral Before breakfast    mupirocin   Nasal BID    nitroGLYCERIN  0.4 mg Sublingual 1 time in Clinic/HOD    pantoprazole  40 mg Oral Daily    topiramate  50 mg Oral BID     PRN Meds:ondansetron, oxyCODONE, oxyCODONE, sodium chloride 0.9%, traMADoL     Review of patient's allergies indicates:   Allergen Reactions    Latex, natural rubber Other (See Comments)     Blisters      Macrolide antibiotics Nausea And Vomiting     Erythromycin      Penicillins Itching    Sulfa (sulfonamide antibiotics) Itching     Objective:     Vital Signs (Most Recent):  Temp: 98.5 °F (36.9 °C) (01/25/24 0352)  Pulse: 76 (01/25/24 0352)  Resp: 18 (01/25/24 0505)  BP: 109/61 (01/25/24 0352)  SpO2: (!) 92 % (01/25/24 0352) Vital Signs (24h Range):  Temp:  [97.7 °F (36.5 °C)-98.5 °F (36.9 °C)] 98.5 °F (36.9 °C)  Pulse:  [72-94] 76  Resp:  [12-20] 18  SpO2:  [92 %-100 %] 92 %  BP: (109-137)/(58-69) 109/61     Weight: 105 kg (231 lb 7.7 oz)  Body mass index is 43.74 kg/m².    Intake/Output - Last 3 Shifts         01/23 0700  01/24 0659 01/24 0700  01/25 0659    P.O.  300    I.V. (mL/kg)  1580 (15)    IV  Piggyback  942.2    Total Intake(mL/kg)  2822.2 (26.9)    Urine (mL/kg/hr)  1105    Stool  0    Total Output  1105    Net  +1717.2          Stool Occurrence  0 x             Physical Exam  Constitutional:       Appearance: Normal appearance.   Cardiovascular:      Rate and Rhythm: Normal rate.   Pulmonary:      Effort: Pulmonary effort is normal.   Abdominal:      General: Abdomen is flat. There is no distension.      Palpations: Abdomen is soft.      Tenderness: There is no abdominal tenderness.      Comments: Incisions c/d/i   Skin:     Capillary Refill: Capillary refill takes less than 2 seconds.   Neurological:      General: No focal deficit present.      Mental Status: She is alert.          Significant Labs:  CBC:   Recent Labs   Lab 01/25/24  0409   WBC 12.24   RBC 3.67*   HGB 11.2*   HCT 34.2*      MCV 93   MCH 30.5   MCHC 32.7     BMP:   Recent Labs   Lab 01/25/24  0409   *      K 3.8      CO2 22*   BUN 22   CREATININE 1.3   CALCIUM 8.3*   MG 1.8       Significant Diagnostics:  I have reviewed all pertinent imaging results/findings within the past 24 hours.  Assessment/Plan:     * Colonic fistula  68 y.o female s/p cholecysto-colic fistula takedown on 1/24/24.     - Doing well  - Diet per CRS  - DVT prophylaxis  - Encourage ambulation  - Encourage IS   - Multimodal pain control    Dispo: Per CRS         Naveed Moses MD  General Surgery  [facility]

## 2024-01-25 NOTE — PLAN OF CARE
Problem: Adult Inpatient Plan of Care  Goal: Plan of Care Review  Outcome: Ongoing, Progressing  Flowsheets (Taken 1/25/2024 0139)  Plan of Care Reviewed With: patient  Goal: Patient-Specific Goal (Individualized)  Outcome: Ongoing, Progressing  Goal: Absence of Hospital-Acquired Illness or Injury  Outcome: Ongoing, Progressing  Intervention: Prevent and Manage VTE (Venous Thromboembolism) Risk  Flowsheets (Taken 1/25/2024 0139)  VTE Prevention/Management:   remove, assess skin, and reapply sequential compression device   dorsiflexion/plantar flexion performed   intravenous hydration   ROM (active) performed  Range of Motion: active ROM (range of motion) encouraged  Intervention: Prevent Infection  Flowsheets (Taken 1/25/2024 0139)  Infection Prevention:   hand hygiene promoted   rest/sleep promoted   single patient room provided  Goal: Optimal Comfort and Wellbeing  Outcome: Ongoing, Progressing  Intervention: Monitor Pain and Promote Comfort  Flowsheets (Taken 1/25/2024 0139)  Pain Management Interventions:   care clustered   pillow support provided   quiet environment facilitated  Intervention: Provide Person-Centered Care  Flowsheets (Taken 1/25/2024 0139)  Trust Relationship/Rapport:   care explained   emotional support provided   empathic listening provided   thoughts/feelings acknowledged   reassurance provided   questions encouraged   choices provided   questions answered  Goal: Readiness for Transition of Care  Outcome: Ongoing, Progressing     Problem: Bariatric Environmental Safety  Goal: Safety Maintained with Care  Outcome: Ongoing, Progressing     Problem: Infection  Goal: Absence of Infection Signs and Symptoms  Outcome: Ongoing, Progressing     Problem: Fall Injury Risk  Goal: Absence of Fall and Fall-Related Injury  Outcome: Ongoing, Progressing  Intervention: Identify and Manage Contributors  Flowsheets (Taken 1/25/2024 0139)  Self-Care Promotion:   independence encouraged   BADL personal objects  within reach  Medication Review/Management: medications reviewed

## 2024-01-25 NOTE — NURSING TRANSFER
Nursing Transfer Note      1/24/2024   6:39 PM    Nurse giving handoff:malissa rn  Nurse receiving handoff:IKER Branch RN     Reason patient is being transferred: recovery care complete    Transfer To: 1021    Transfer via bed    Transfer with cardiac monitoring, iv pump/pole    Transported by RN/PCT    Telemetry: Box Number YES  Order for Tele Monitor? Yes    Additional Lines: Jenkins Catheter    4eyes on Skin: yes    Medicines sent: NS    Any special needs or follow-up needed: routine    Patient belongings transferred with patient:  n/a    Chart send with patient: Yes    Notified: spouse    Patient reassessed at: 01/24/24 1800

## 2024-01-26 VITALS
RESPIRATION RATE: 18 BRPM | DIASTOLIC BLOOD PRESSURE: 65 MMHG | OXYGEN SATURATION: 93 % | TEMPERATURE: 99 F | HEIGHT: 61 IN | WEIGHT: 231.5 LBS | SYSTOLIC BLOOD PRESSURE: 143 MMHG | BODY MASS INDEX: 43.71 KG/M2 | HEART RATE: 74 BPM

## 2024-01-26 PROCEDURE — 25000003 PHARM REV CODE 250: Performed by: STUDENT IN AN ORGANIZED HEALTH CARE EDUCATION/TRAINING PROGRAM

## 2024-01-26 RX ORDER — OXYCODONE HYDROCHLORIDE 5 MG/1
5 TABLET ORAL EVERY 4 HOURS PRN
Qty: 30 TABLET | Refills: 0 | Status: SHIPPED | OUTPATIENT
Start: 2024-01-26 | End: 2024-01-27

## 2024-01-26 RX ADMIN — LEVOTHYROXINE SODIUM 75 MCG: 75 TABLET ORAL at 05:01

## 2024-01-26 RX ADMIN — ACETAMINOPHEN 1000 MG: 500 TABLET ORAL at 05:01

## 2024-01-26 RX ADMIN — MUPIROCIN: 20 OINTMENT TOPICAL at 08:01

## 2024-01-26 RX ADMIN — PANTOPRAZOLE SODIUM 40 MG: 40 TABLET, DELAYED RELEASE ORAL at 08:01

## 2024-01-26 RX ADMIN — IBUPROFEN 800 MG: 400 TABLET ORAL at 05:01

## 2024-01-26 RX ADMIN — TOPIRAMATE 50 MG: 25 TABLET, FILM COATED ORAL at 08:01

## 2024-01-26 RX ADMIN — GABAPENTIN 300 MG: 300 CAPSULE ORAL at 08:01

## 2024-01-26 RX ADMIN — ALVIMOPAN 12 MG: 12 CAPSULE ORAL at 08:01

## 2024-01-26 NOTE — PLAN OF CARE
[facility]  Discharge Final Note    Primary Care Provider: Louis Aleman MD  Expected Discharge Date: 1/26/2024    Patient medically ready for discharge to home.     Transportation by family.     Is family/patient aware of discharge: yes     Hospital follow up scheduled: yes       Final Discharge Note (most recent)       Final Note - 01/26/24 1301          Final Note    Assessment Type Final Discharge Note     Anticipated Discharge Disposition Home or Self Care     Hospital Resources/Appts/Education Provided Provided patient/caregiver with written discharge plan information                     Important Message from Medicare         Referral Info (most recent)       Referral Info    No documentation.                   Future Appointments   Date Time Provider Department Center   2/6/2024  9:45 AM Fernando Watkins MD [facility]   2/9/2024  2:20 PM DAI Benitez MD [facility], [place]  Case Management  Ext: [phone]  01/26/2024

## 2024-01-26 NOTE — PROGRESS NOTES
[facility]  General Surgery  Progress Note    Subjective:     History of Present Illness:  No notes on file    Post-Op Info:  Procedure(s) (LRB):  COLECTOMY, RIGHT, LAPAROSCOPIC, ERAS low (N/A)  CHOLECYSTECTOMY, LAPAROSCOPIC (N/A)   2 Days Post-Op     Interval History: No acute events overnight, HDS, AF. Pain is controlled, no N/V    Medications:  Continuous Infusions:  Scheduled Meds:   acetaminophen  1,000 mg Oral Q8H    alvimopan  12 mg Oral BID    enoxparin  40 mg Subcutaneous Daily    fenofibrate  145 mg Oral QHS    gabapentin  300 mg Oral TID    ibuprofen  800 mg Oral Q8H    levothyroxine  75 mcg Oral Before breakfast    mupirocin   Nasal BID    nitroGLYCERIN  0.4 mg Sublingual 1 time in Clinic/HOD    pantoprazole  40 mg Oral Daily    topiramate  50 mg Oral BID     PRN Meds:ondansetron, oxyCODONE, oxyCODONE, sodium chloride 0.9%, traMADoL     Review of patient's allergies indicates:   Allergen Reactions    Latex, natural rubber Other (See Comments)     Blisters      Macrolide antibiotics Nausea And Vomiting     Erythromycin      Penicillins Itching    Sulfa (sulfonamide antibiotics) Itching     Objective:     Vital Signs (Most Recent):  Temp: 98.8 °F (37.1 °C) (01/26/24 0707)  Pulse: 70 (01/26/24 0707)  Resp: 18 (01/26/24 0707)  BP: (!) 104/51 (01/26/24 0707)  SpO2: (!) 93 % (01/26/24 0707) Vital Signs (24h Range):  Temp:  [97.8 °F (36.6 °C)-98.8 °F (37.1 °C)] 98.8 °F (37.1 °C)  Pulse:  [56-79] 70  Resp:  [18] 18  SpO2:  [92 %-97 %] 93 %  BP: (100-137)/(51-75) 104/51     Weight: 105 kg (231 lb 7.7 oz)  Body mass index is 43.74 kg/m².    Intake/Output - Last 3 Shifts         01/24 0700 01/25 0659 01/25 0700 01/26 0659 01/26 0700 01/27 0659    P.O. 300 300     I.V. (mL/kg) 1580 (15)      IV Piggyback 942.2      Total Intake(mL/kg) 2822.2 (26.9) 300 (2.9)     Urine (mL/kg/hr) 1105      Stool 0      Total Output 1105      Net +1717.2 +300            Urine Occurrence  3 x     Stool Occurrence 0 x 3 x               Physical Exam  Constitutional:       Appearance: Normal appearance.   Cardiovascular:      Rate and Rhythm: Normal rate.   Pulmonary:      Effort: Pulmonary effort is normal.   Abdominal:      General: Abdomen is flat. There is no distension.      Palpations: Abdomen is soft.      Tenderness: There is no abdominal tenderness.      Comments: Incisions c/d/i   Skin:     Capillary Refill: Capillary refill takes less than 2 seconds.   Neurological:      General: No focal deficit present.      Mental Status: She is alert.          Significant Labs:  I have reviewed all pertinent lab results within the past 24 hours.  CBC:   Recent Labs   Lab 01/25/24  0409   WBC 12.24   RBC 3.67*   HGB 11.2*   HCT 34.2*      MCV 93   MCH 30.5   MCHC 32.7     CMP:   Recent Labs   Lab 01/25/24  0409   *   CALCIUM 8.3*   ALBUMIN 3.4*   PROT 6.3      K 3.8   CO2 22*      BUN 22   CREATININE 1.3   ALKPHOS 64   ALT 80*   AST 73*   BILITOT 0.5       Significant Diagnostics:  I have reviewed all pertinent imaging results/findings within the past 24 hours.  Assessment/Plan:     * Colonic fistula  68 y.o female s/p cholecysto-colic fistula takedown on 1/24/24.     - Doing well  - Diet per CRS  - DVT prophylaxis  - Encourage ambulation  - Encourage IS   - Multimodal pain control    Dispo: Per CRS         Niall Jasmine MD  General Surgery  [facility]

## 2024-01-26 NOTE — PROGRESS NOTES
[facility] Plan of Care Note  Dx Colonic fistula     Shift Events oob ambulated halls    Goals of Care: ambulate halls     Neuro: aox4     Vital Signs: vss     Respiratory: RA    Diet: low fiber/ residue     Is patient tolerating current diet? Yes     GTTS: SL     Urine Output/Bowel Movement: oob independent to bathroom     Drains/Tubes/Tube Feeds (include total output/shift): na     Lines: piv left fa       Accuchecks:na     Skin: lap sx sites x9 with steristrips     Fall Risk Score: oobx1     Activity level? Oobx1     Any scheduled procedures? Na     Any safety concerns? Na     Other: na

## 2024-01-26 NOTE — HOSPITAL COURSE
Ms. Mathias is a 69yo female who presented with cholecystocolonic fistula who underwent laparoscopic cholecystectomy and right colectomy on 1/24. Post-op she did well with appropriate pain control, tolerance of diet, ambulating on her own, and return of bowel function on POD1. Her labs were acceptable and she was able to be discharged home on 1/26

## 2024-01-26 NOTE — NURSING
[facility] Plan of Care Note  Dx Colonic fistula      Shift Events:  No significant events this shift.     Goals of Care: Pain Control.      Neuro: aox4      Vital Signs: vss      Respiratory: RA     Diet: low fiber/ residue      Is patient tolerating current diet? Yes      GTTS: SL      Urine Output/Bowel Movement: oob independent to bathroom      Drains/Tubes/Tube Feeds (include total output/shift): na      Lines: piv left fa         Accuchecks: na      Skin: lap sx sites x9 with steristrips      Fall Risk Score:  8     Activity level? Independent.      Any scheduled procedures? Na      Any safety concerns? Na      Other: na

## 2024-01-26 NOTE — NURSING
Pt AAO x 4, ambulatory, &VS stable.  Pt IV removed.  Discharge instructions provided, pt verbalizes understanding.  Transport requested, as pt spouse she be arriving shortly.

## 2024-01-26 NOTE — SUBJECTIVE & OBJECTIVE
Interval History: No acute events overnight, HDS, AF. Pain is controlled, no N/V    Medications:  Continuous Infusions:  Scheduled Meds:   acetaminophen  1,000 mg Oral Q8H    alvimopan  12 mg Oral BID    enoxparin  40 mg Subcutaneous Daily    fenofibrate  145 mg Oral QHS    gabapentin  300 mg Oral TID    ibuprofen  800 mg Oral Q8H    levothyroxine  75 mcg Oral Before breakfast    mupirocin   Nasal BID    nitroGLYCERIN  0.4 mg Sublingual 1 time in Clinic/HOD    pantoprazole  40 mg Oral Daily    topiramate  50 mg Oral BID     PRN Meds:ondansetron, oxyCODONE, oxyCODONE, sodium chloride 0.9%, traMADoL     Review of patient's allergies indicates:   Allergen Reactions    Latex, natural rubber Other (See Comments)     Blisters      Macrolide antibiotics Nausea And Vomiting     Erythromycin      Penicillins Itching    Sulfa (sulfonamide antibiotics) Itching     Objective:     Vital Signs (Most Recent):  Temp: 98.8 °F (37.1 °C) (01/26/24 0707)  Pulse: 70 (01/26/24 0707)  Resp: 18 (01/26/24 0707)  BP: (!) 104/51 (01/26/24 0707)  SpO2: (!) 93 % (01/26/24 0707) Vital Signs (24h Range):  Temp:  [97.8 °F (36.6 °C)-98.8 °F (37.1 °C)] 98.8 °F (37.1 °C)  Pulse:  [56-79] 70  Resp:  [18] 18  SpO2:  [92 %-97 %] 93 %  BP: (100-137)/(51-75) 104/51     Weight: 105 kg (231 lb 7.7 oz)  Body mass index is 43.74 kg/m².    Intake/Output - Last 3 Shifts         01/24 0700 01/25 0659 01/25 0700 01/26 0659 01/26 0700 01/27 0659    P.O. 300 300     I.V. (mL/kg) 1580 (15)      IV Piggyback 942.2      Total Intake(mL/kg) 2822.2 (26.9) 300 (2.9)     Urine (mL/kg/hr) 1105      Stool 0      Total Output 1105      Net +1717.2 +300            Urine Occurrence  3 x     Stool Occurrence 0 x 3 x              Physical Exam  Constitutional:       Appearance: Normal appearance.   Cardiovascular:      Rate and Rhythm: Normal rate.   Pulmonary:      Effort: Pulmonary effort is normal.   Abdominal:      General: Abdomen is flat. There is no distension.       Palpations: Abdomen is soft.      Tenderness: There is no abdominal tenderness.      Comments: Incisions c/d/i   Skin:     Capillary Refill: Capillary refill takes less than 2 seconds.   Neurological:      General: No focal deficit present.      Mental Status: She is alert.          Significant Labs:  I have reviewed all pertinent lab results within the past 24 hours.  CBC:   Recent Labs   Lab 01/25/24  0409   WBC 12.24   RBC 3.67*   HGB 11.2*   HCT 34.2*      MCV 93   MCH 30.5   MCHC 32.7     CMP:   Recent Labs   Lab 01/25/24  0409   *   CALCIUM 8.3*   ALBUMIN 3.4*   PROT 6.3      K 3.8   CO2 22*      BUN 22   CREATININE 1.3   ALKPHOS 64   ALT 80*   AST 73*   BILITOT 0.5       Significant Diagnostics:  I have reviewed all pertinent imaging results/findings within the past 24 hours.

## 2024-01-26 NOTE — PLAN OF CARE
Problem: Infection  Goal: Absence of Infection Signs and Symptoms  Outcome: Ongoing, Progressing  Intervention: Prevent or Manage Infection  Flowsheets (Taken 1/26/2024 0032)  Fever Reduction/Comfort Measures:   lightweight bedding   lightweight clothing  Infection Management: aseptic technique maintained  Isolation Precautions: precautions maintained

## 2024-01-27 ENCOUNTER — NURSE TRIAGE (OUTPATIENT)
Dept: ADMINISTRATIVE | Facility: CLINIC | Age: 69
End: 2024-01-27
Payer: MEDICARE

## 2024-01-27 RX ORDER — TRAMADOL HYDROCHLORIDE 50 MG/1
50 TABLET ORAL EVERY 4 HOURS PRN
Qty: 30 TABLET | Refills: 0 | Status: SHIPPED | OUTPATIENT
Start: 2024-01-27

## 2024-01-27 NOTE — PROGRESS NOTES
Patient called to request change in pain medications. Does not want oxycodone and is requesting tramadol for postoperative pain. Oxycodone order cancelled and tramadol sent to [facility].

## 2024-01-27 NOTE — TELEPHONE ENCOUNTER
Reason for Disposition   [1] Caller has URGENT question AND [2] triager unable to answer question    Additional Information   Negative: Sounds like a life-threatening emergency to the triager   Negative: Chest pain   Negative: Difficulty breathing   Negative: [1] Widespread rash AND [2] bright red, sunburn-like   Negative: [1] SEVERE headache AND [2] after spinal (epidural) anesthesia   Negative: [1] Vomiting AND [2] persists > 4 hours   Negative: [1] Vomiting AND [2] abdomen looks much more swollen than usual   Negative: [1] Drinking very little AND [2] dehydration suspected (e.g., no urine > 12 hours, very dry mouth, very lightheaded)   Negative: Patient sounds very sick or weak to the triager   Negative: Sounds like a serious complication to the triager   Negative: Fever > 100.4 F (38.0 C)   Negative: [1] SEVERE post-op pain (e.g., excruciating, pain scale 8-10) AND [2] not controlled with pain medications    Protocols used: Post-Op Symptoms and [id number]  Pt states she had a colectomy and discharged home yesterday. She states oxycodone was ordered, but she will not pick it up from the pharmacy. Pt states she does not want to take that. Pt is requesting Tramadol 50 mg tabs to be called in to [facility] on [address]. Spoke with Provider on call Dr. DAYANARA Conti and stated he will send it now. Pt notified and verbalized understanding.

## 2024-02-05 NOTE — PROGRESS NOTES
"    Post-Op Follow-up Visit:   2/6/2024  Patient ID: Danyell Mathias is a 68 y.o. female, born 1955    Chief Complaint   Patient presents with    Post-op Evaluation       12/2023: positive FIT test, colonoscopy revealed colonic fistula; CT = diminutive gallbladder with a fistula tract within the hepatic flexure of the colon, +pneumobilia throughout the biliary ductal system  ** Cholecysto colonic fistula   1/24/2024:  Laparoscopic right colectomy.  Laparoscopic cholecystectomy per Dr. Benitez and Dr. Watkins    Interval History: This 67 y/o female returns to clinic from [place], accompanied by her . She was seen by Dr. Watkins last month in clinic. Since then, she underwent combo surgery with lap cholecystectomy and R colectomy on 1/24/24. She was discharged to home on POD 2.    Pathology is pending.   Overall she is feeling well. Reports intermittent, sharp epigastric pain "feels like a toothache", occurs 2-3 times daily for short duration <1 minute, unable to identify trigger or alleviating factor. She also has discomfort to LLQ trocar site.   She reports a decent appetite, tolerating 2 meals daily which is her usual meal pattern. Denies N/V/D. Last BM this morning. Weight is down since surgery. Remains afebrile. Sleeping OK.     Physical Exam:  BP (!) 151/73 (BP Location: Left arm, Patient Position: Sitting)   Pulse 80   Temp 97.9 °F (36.6 °C) (Oral)   Ht 5' 1.2" (1.554 m)   Wt 101.5 kg (223 lb 12.3 oz)   LMP  (LMP Unknown)   SpO2 96%   BMI 42.00 kg/m²     General:  Non-toxic, ambulatory  Abd:  Soft, non-tender  Incision:  lap simone RUQ incision with edges well approximated without erythema or exudate, several abd trocar sites with edges well approximated without erythema or exudate    Pathology: pending         ICD-10-CM ICD-9-CM    1. Biliary tract fistula  K83.3 576.4       2. S/P laparoscopic cholecystectomy  Z90.49 V45.89       Plan   Await final pathology  Continue regular diet  May increase " physical activity but no heavy lifting until 6 weeks post op.   F/u with Dr. Judge as recommended.      Follow up if symptoms worsen or fail to improve.    Questions were asked and answered to patient's satisfaction.          Sisi Ybarra NP  Upper GI / Hepatobiliary Surgical Oncology  [facility] [place], [place]  Office: [phone]  Fax: [phone]

## 2024-02-06 ENCOUNTER — OFFICE VISIT (OUTPATIENT)
Dept: SURGERY | Facility: CLINIC | Age: 69
End: 2024-02-06
Payer: MEDICARE

## 2024-02-06 VITALS
TEMPERATURE: 98 F | HEIGHT: 61 IN | HEART RATE: 80 BPM | BODY MASS INDEX: 42.24 KG/M2 | WEIGHT: 223.75 LBS | DIASTOLIC BLOOD PRESSURE: 73 MMHG | SYSTOLIC BLOOD PRESSURE: 151 MMHG | OXYGEN SATURATION: 96 %

## 2024-02-06 DIAGNOSIS — K83.3: Primary | ICD-10-CM

## 2024-02-06 DIAGNOSIS — Z90.49 S/P LAPAROSCOPIC CHOLECYSTECTOMY: ICD-10-CM

## 2024-02-06 PROCEDURE — 99024 POSTOP FOLLOW-UP VISIT: CPT | Mod: S$GLB,,, | Performed by: NURSE PRACTITIONER

## 2024-02-06 PROCEDURE — 99999 PR PBB SHADOW E&M-EST. PATIENT-LVL IV: CPT | Mod: PBBFAC,,, | Performed by: NURSE PRACTITIONER

## 2024-02-09 ENCOUNTER — OFFICE VISIT (OUTPATIENT)
Dept: SURGERY | Facility: CLINIC | Age: 69
End: 2024-02-09
Payer: MEDICARE

## 2024-02-09 VITALS
BODY MASS INDEX: 41.78 KG/M2 | SYSTOLIC BLOOD PRESSURE: 116 MMHG | HEIGHT: 62 IN | WEIGHT: 227.06 LBS | DIASTOLIC BLOOD PRESSURE: 72 MMHG | HEART RATE: 82 BPM

## 2024-02-09 DIAGNOSIS — K63.2 COLONIC FISTULA: Primary | ICD-10-CM

## 2024-02-09 PROCEDURE — 99024 POSTOP FOLLOW-UP VISIT: CPT | Mod: S$GLB,,, | Performed by: COLON & RECTAL SURGERY

## 2024-02-09 PROCEDURE — 99999 PR PBB SHADOW E&M-EST. PATIENT-LVL III: CPT | Mod: PBBFAC,,, | Performed by: COLON & RECTAL SURGERY

## 2024-02-09 NOTE — PROGRESS NOTES
CRS Office Visit POSTOP    Referring Md:   No referring provider defined for this encounter.    SUBJECTIVE:     Chief Complaint: cholecystic-colonic fistula.    History of Present Illness:  Course is as follows:  Patient is a 68 y.o. female presents with cholecysto-colonic fistula. She reports a remote history of gallstones and a provider recommending her having her gallbladder out in the past, but she never sought treatment. She denies recurrent cholecystitis or bouts of biliary colic. She has no right upper quadrant abdominal pain. Previous hysterectomy through pfannenstiel incision.  No other abdominal surgeries.  Functionally, she is active and healthy.  Nondiabetic.  No heart disease.  She is daily bowel movements.  No recent change in her weight.  No issues with fecal incontinence.  Does have a significant family history of colon cancer in her father.    Colonoscopy done on 12/13/23 for + FIT test.  Found to have a colonic fistula.   CT abd pelvis: 12/14/23:  demonstrates a diminutive gallbladder with a fistula tract within the hepatic flexure of the colon; there is pneumobilia throughout the biliary ductal system    1/24/24: Laparoscopic right colectomy.  Laparoscopic cholecystectomy   Pathology:    - prelim discussion with pathology: negative for malignancy.     2/9/24: doing well.  Some pain on the left side of her abdomen.  No drainage from her incision.  No fevers.  Having regular bowel movements.  Complaints of back pain.      Last Colonoscopy: 12/13/2023:   Impression:            - Non-bleeding internal hemorrhoids.                          - Suspected Colonic fistula with surrounding                          irregular tissue, biopsied and tattoo placed just                          distal..                          - Diverticulosis in the sigmoid colon and in the                          descending colon.                          - The examination was otherwise normal on direct                           "and retroflexion views.                          There is what appears to be pinhole opening with                          irregular mucosa, i suspect at the hepatic                          flexure, tattooed just distal, biopsied.   Review of Systems:  Review of Systems   Constitutional:  Negative for chills, diaphoresis, fever, malaise/fatigue and weight loss.   HENT:  Negative for congestion.    Respiratory:  Negative for shortness of breath.    Cardiovascular:  Negative for chest pain and leg swelling.   Gastrointestinal:  Negative for abdominal pain, blood in stool, constipation, nausea and vomiting.   Genitourinary:  Negative for dysuria.   Musculoskeletal:  Negative for back pain and myalgias.   Skin:  Negative for rash.   Neurological:  Negative for dizziness and weakness.   Endo/Heme/Allergies:  Does not bruise/bleed easily.   Psychiatric/Behavioral:  Negative for depression.        OBJECTIVE:     Vital Signs (Most Recent)  /72 (BP Location: Right arm, Patient Position: Sitting, BP Method: Medium (Automatic))   Pulse 82   Ht 5' 2" (1.575 m)   Wt 103 kg (227 lb 1.2 oz)   LMP  (LMP Unknown)   BMI 41.53 kg/m²     Physical Exam:  General: White female in no distress   Neuro: alert and oriented x 4.  Moves all extremities.     HEENT: no icterus.  Trachea midline  Respiratory: respirations are even and unlabored  Cardiac: regular rate  Abdomen: soft, non-distended, non-tender to palpation  Extremities: Warm dry and intact  Skin: no rashes  Anorectal: deferred    Labs: H&H 13 and 39.  Creatinine 1.2.      Imaging:   CT abd pelvis 12/14/23: reviewed  - Mostly decompressed gallbladder and wall thickening with area of the gallbladder fundus closely approximating portion of the hepatic flexure.  No other secondary inflammatory changes otherwise noted.  Given accompanying pneumobilia and reported pin-hole opening at the hepatic flexure by colonoscopy, a cholecysto-colonic fistula is certainly a " consideration.  Recommend further correlation with ERCP.    - Hepatic steatosis and additional findings as above.    - Left lower lobe 0.6 cm pulmonary nodule, technically indeterminate.  Follow-up CT in 6-12 months can be obtained to ensure stability.      ASSESSMENT/PLAN:     Diagnoses and all orders for this visit:    Colonic fistula      68 y.o. 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     She is having ongoing back pain.  She has a 2cm ovarian cyst on her CT scan that is unlikely to result in pain.  It is more likely that she is having post operative pain.  Therefore, recommended waiting an additional 2-4 weeks.  If still having pain, will plan to repeat CT scan.      I will see her back in 4 weeks.       DAI Benitez MD, FACS, FASCRS  Staff Surgeon  Colon & Rectal Surgery

## 2024-02-14 LAB
FINAL PATHOLOGIC DIAGNOSIS: NORMAL
GROSS: NORMAL
Lab: NORMAL
MICROSCOPIC EXAM: NORMAL

## 2024-02-16 NOTE — PHYSICIAN QUERY
PT Name: Danyell Mathias  MR #: [id number]    DOCUMENTATION CLARIFICATION     CDS: Brandy Capley, RN  Email: [email or web address]  This form is a permanent document in the medical record.     Query Date: February 16, 2024    By submitting this query, we are merely seeking further clarification of documentation.  Please utilize your independent clinical judgment when addressing the question(s) below.    The medical record contains the following:  Pathology Findings Location in Medical Record     Gallbladder and fistula to colon, cholecystectomy:   - Acute on chronic cholecystitis with prominent smooth muscle hypertrophy and fistula tract extending to colon, may represent changes secondary to longstanding and/or recurrent cholecystitis - Background colonic mucosa largely spared of inflammatory changes and without definitive features of chronic mucosal injury   - Negative for granulomas or viral inclusions   - Extensive serosal adhesions   - Negative for dysplasia or malignancy   Surgical pathology 1/24       Please clarify the pathology findings of acute on chronic cholecystitis:    [ xx ] Pathology findings noted above are ruled in/confirmed as diagnoses     [  ] Pathology findings noted above are not confirmed as diagnoses     [  ] Other diagnosis (please specify): ___________     [  ] Clinically Undetermined       Please document in your progress notes daily for the duration of treatment until resolved and include in your discharge summary.    Form No. [id number]

## 2024-03-11 ENCOUNTER — TELEPHONE (OUTPATIENT)
Dept: SURGERY | Facility: CLINIC | Age: 69
End: 2024-03-11
Payer: MEDICARE

## 2024-03-12 ENCOUNTER — LAB VISIT (OUTPATIENT)
Dept: LAB | Facility: HOSPITAL | Age: 69
End: 2024-03-12
Attending: COLON & RECTAL SURGERY
Payer: MEDICARE

## 2024-03-12 ENCOUNTER — OFFICE VISIT (OUTPATIENT)
Dept: SURGERY | Facility: CLINIC | Age: 69
End: 2024-03-12
Payer: MEDICARE

## 2024-03-12 VITALS
HEART RATE: 70 BPM | SYSTOLIC BLOOD PRESSURE: 146 MMHG | WEIGHT: 234.81 LBS | HEIGHT: 62 IN | DIASTOLIC BLOOD PRESSURE: 75 MMHG | BODY MASS INDEX: 43.21 KG/M2

## 2024-03-12 DIAGNOSIS — R30.0 DYSURIA: ICD-10-CM

## 2024-03-12 DIAGNOSIS — R10.9 LEFT FLANK PAIN: Primary | ICD-10-CM

## 2024-03-12 DIAGNOSIS — R10.9 LEFT FLANK PAIN: ICD-10-CM

## 2024-03-12 LAB
ALBUMIN SERPL BCP-MCNC: 3.7 G/DL (ref 3.5–5.2)
ALP SERPL-CCNC: 89 U/L (ref 55–135)
ALT SERPL W/O P-5'-P-CCNC: 25 U/L (ref 10–44)
ANION GAP SERPL CALC-SCNC: 7 MMOL/L (ref 8–16)
AST SERPL-CCNC: 22 U/L (ref 10–40)
BASOPHILS # BLD AUTO: 0.04 K/UL (ref 0–0.2)
BASOPHILS NFR BLD: 0.6 % (ref 0–1.9)
BILIRUB SERPL-MCNC: 0.3 MG/DL (ref 0.1–1)
BUN SERPL-MCNC: 27 MG/DL (ref 8–23)
CALCIUM SERPL-MCNC: 9.9 MG/DL (ref 8.7–10.5)
CHLORIDE SERPL-SCNC: 109 MMOL/L (ref 95–110)
CO2 SERPL-SCNC: 26 MMOL/L (ref 23–29)
CREAT SERPL-MCNC: 1.2 MG/DL (ref 0.5–1.4)
DIFFERENTIAL METHOD BLD: ABNORMAL
EOSINOPHIL # BLD AUTO: 0.4 K/UL (ref 0–0.5)
EOSINOPHIL NFR BLD: 6.3 % (ref 0–8)
ERYTHROCYTE [DISTWIDTH] IN BLOOD BY AUTOMATED COUNT: 13.3 % (ref 11.5–14.5)
EST. GFR  (NO RACE VARIABLE): 48.7 ML/MIN/1.73 M^2
GLUCOSE SERPL-MCNC: 92 MG/DL (ref 70–110)
HCT VFR BLD AUTO: 36.9 % (ref 37–48.5)
HGB BLD-MCNC: 12 G/DL (ref 12–16)
IMM GRANULOCYTES # BLD AUTO: 0.02 K/UL (ref 0–0.04)
IMM GRANULOCYTES NFR BLD AUTO: 0.3 % (ref 0–0.5)
LYMPHOCYTES # BLD AUTO: 1.8 K/UL (ref 1–4.8)
LYMPHOCYTES NFR BLD: 26.8 % (ref 18–48)
MCH RBC QN AUTO: 31.2 PG (ref 27–31)
MCHC RBC AUTO-ENTMCNC: 32.5 G/DL (ref 32–36)
MCV RBC AUTO: 96 FL (ref 82–98)
MONOCYTES # BLD AUTO: 0.7 K/UL (ref 0.3–1)
MONOCYTES NFR BLD: 10.6 % (ref 4–15)
NEUTROPHILS # BLD AUTO: 3.7 K/UL (ref 1.8–7.7)
NEUTROPHILS NFR BLD: 55.4 % (ref 38–73)
NRBC BLD-RTO: 0 /100 WBC
PLATELET # BLD AUTO: 346 K/UL (ref 150–450)
PMV BLD AUTO: 10.8 FL (ref 9.2–12.9)
POTASSIUM SERPL-SCNC: 4.4 MMOL/L (ref 3.5–5.1)
PROT SERPL-MCNC: 7 G/DL (ref 6–8.4)
RBC # BLD AUTO: 3.85 M/UL (ref 4–5.4)
SODIUM SERPL-SCNC: 142 MMOL/L (ref 136–145)
WBC # BLD AUTO: 6.69 K/UL (ref 3.9–12.7)

## 2024-03-12 PROCEDURE — 80053 COMPREHEN METABOLIC PANEL: CPT | Performed by: COLON & RECTAL SURGERY

## 2024-03-12 PROCEDURE — 99024 POSTOP FOLLOW-UP VISIT: CPT | Mod: S$GLB,,, | Performed by: COLON & RECTAL SURGERY

## 2024-03-12 PROCEDURE — 36415 COLL VENOUS BLD VENIPUNCTURE: CPT | Performed by: COLON & RECTAL SURGERY

## 2024-03-12 PROCEDURE — 99999 PR PBB SHADOW E&M-EST. PATIENT-LVL III: CPT | Mod: PBBFAC,,, | Performed by: COLON & RECTAL SURGERY

## 2024-03-12 PROCEDURE — 85025 COMPLETE CBC W/AUTO DIFF WBC: CPT | Performed by: COLON & RECTAL SURGERY

## 2024-03-12 NOTE — PROGRESS NOTES
CRS Office Visit POSTOP    Referring Md:   No referring provider defined for this encounter.    SUBJECTIVE:     Chief Complaint: cholecystic-colonic fistula.    History of Present Illness:  Course is as follows:  Patient is a 68 y.o. female presents with cholecysto-colonic fistula. She reports a remote history of gallstones and a provider recommending her having her gallbladder out in the past, but she never sought treatment. She denies recurrent cholecystitis or bouts of biliary colic. She has no right upper quadrant abdominal pain. Previous hysterectomy through pfannenstiel incision.  No other abdominal surgeries.  Functionally, she is active and healthy.  Nondiabetic.  No heart disease.  She is daily bowel movements.  No recent change in her weight.  No issues with fecal incontinence.  Does have a significant family history of colon cancer in her father.    Colonoscopy done on 12/13/23 for + FIT test.  Found to have a colonic fistula.   CT abd pelvis: 12/14/23:  demonstrates a diminutive gallbladder with a fistula tract within the hepatic flexure of the colon; there is pneumobilia throughout the biliary ductal system    1/24/24: Laparoscopic right colectomy.  Laparoscopic cholecystectomy   Pathology:    -  negative for malignancy.    - Acute on chronic cholecystitis with prominent smooth muscle hypertrophy and fistula tract extending to colon, may represent changes secondary to longstanding and/or recurrent cholecystitis     2/9/24: doing well.  Some pain on the left side of her abdomen.  No drainage from her incision.  No fevers.  Having regular bowel movements.  Complaints of back pain.    3/12/24: doing well, continues to have back pain on left side, stable from last clinic visit.  Having regular bowel movements.  Otherwise, incisions healing well, having regular bowel movements, no nausea or vomiting    Last Colonoscopy: 12/13/2023:   Impression:            - Non-bleeding internal hemorrhoids.                  "         - Suspected Colonic fistula with surrounding                          irregular tissue, biopsied and tattoo placed just                          distal..                          - Diverticulosis in the sigmoid colon and in the                          descending colon.                          - The examination was otherwise normal on direct                          and retroflexion views.                          There is what appears to be pinhole opening with                          irregular mucosa, i suspect at the hepatic                          flexure, tattooed just distal, biopsied.   Review of Systems:  Review of Systems   Constitutional:  Negative for chills, diaphoresis, fever, malaise/fatigue and weight loss.   HENT:  Negative for congestion.    Respiratory:  Negative for shortness of breath.    Cardiovascular:  Negative for chest pain and leg swelling.   Gastrointestinal:  Negative for abdominal pain, blood in stool, constipation, nausea and vomiting.   Genitourinary:  Negative for dysuria.   Musculoskeletal:  Negative for back pain and myalgias.   Skin:  Negative for rash.   Neurological:  Negative for dizziness and weakness.   Endo/Heme/Allergies:  Does not bruise/bleed easily.   Psychiatric/Behavioral:  Negative for depression.        OBJECTIVE:     Vital Signs (Most Recent)  BP (!) 146/75 (BP Location: Left arm, Patient Position: Sitting)   Pulse 70   Ht 5' 2.01" (1.575 m)   Wt 106.5 kg (234 lb 12.6 oz)   LMP  (LMP Unknown)   BMI 42.93 kg/m²     Physical Exam:  General: White female in no distress   Neuro: alert and oriented x 4.  Moves all extremities.     HEENT: no icterus.  Trachea midline  Respiratory: respirations are even and unlabored  Cardiac: regular rate  Abdomen: soft, non-distended, non-tender to palpation  Back: no midline tenderness.  Tenderness to palpation over the costophrenic angle on the left side.  Extremities: Warm dry and intact  Skin: no rashes  Anorectal: " deferred    Labs: H&H 13 and 39.  Creatinine 1.2.      Imaging:   CT abd pelvis 12/14/23: reviewed  - Mostly decompressed gallbladder and wall thickening with area of the gallbladder fundus closely approximating portion of the hepatic flexure.  No other secondary inflammatory changes otherwise noted.  Given accompanying pneumobilia and reported pin-hole opening at the hepatic flexure by colonoscopy, a cholecysto-colonic fistula is certainly a consideration.  Recommend further correlation with ERCP.    - Hepatic steatosis and additional findings as above.    - Left lower lobe 0.6 cm pulmonary nodule, technically indeterminate.  Follow-up CT in 6-12 months can be obtained to ensure stability.      ASSESSMENT/PLAN:     Diagnoses and all orders for this visit:    Left flank pain  -     CBC Auto Differential; Future  -     Comprehensive Metabolic Panel; Future  -     CT Abdomen Pelvis With IV Contrast Routine Oral Contrast; Future  -     Urinalysis, Reflex to Urine Culture Urine, Clean Catch; Future    Dysuria  -     CBC Auto Differential; Future  -     Comprehensive Metabolic Panel; Future  -     CT Abdomen Pelvis With IV Contrast Routine Oral Contrast; Future  -     Urinalysis, Reflex to Urine Culture Urine, Clean Catch; Future        68 y.o. 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  She is overall doing very well from a GI standpoint.  However, she continues to have left sided mid back pain.  Associated with this, she cloudy urine.  Will plan further evaluation with the urinalysis, blood work, and a CT scan.  I will follow up with her with the results.         DAI Benitez MD, FACS, FASCRS  Staff Surgeon  Colon & Rectal Surgery

## 2024-03-13 ENCOUNTER — PATIENT MESSAGE (OUTPATIENT)
Dept: SURGERY | Facility: CLINIC | Age: 69
End: 2024-03-13
Payer: MEDICARE

## 2024-03-13 RX ORDER — LEVOFLOXACIN 750 MG/1
750 TABLET ORAL DAILY
Qty: 7 TABLET | Refills: 0 | Status: SHIPPED | OUTPATIENT
Start: 2024-03-13 | End: 2024-03-20

## 2024-04-11 ENCOUNTER — PATIENT MESSAGE (OUTPATIENT)
Dept: SURGERY | Facility: CLINIC | Age: 69
End: 2024-04-11
Payer: MEDICARE

## 2024-04-11 DIAGNOSIS — K63.2 COLONIC FISTULA: ICD-10-CM

## 2024-04-11 DIAGNOSIS — R30.0 DYSURIA: Primary | ICD-10-CM

## 2024-04-16 ENCOUNTER — LAB VISIT (OUTPATIENT)
Dept: LAB | Facility: HOSPITAL | Age: 69
End: 2024-04-16
Attending: COLON & RECTAL SURGERY
Payer: MEDICARE

## 2024-04-16 DIAGNOSIS — R30.0 DYSURIA: ICD-10-CM

## 2024-04-16 DIAGNOSIS — K63.2 COLONIC FISTULA: ICD-10-CM

## 2024-04-16 LAB
ALBUMIN SERPL BCP-MCNC: 3.8 G/DL (ref 3.5–5.2)
ALP SERPL-CCNC: 83 U/L (ref 55–135)
ALT SERPL W/O P-5'-P-CCNC: 21 U/L (ref 10–44)
ANION GAP SERPL CALC-SCNC: 9 MMOL/L (ref 8–16)
AST SERPL-CCNC: 19 U/L (ref 10–40)
BASOPHILS # BLD AUTO: 0.03 K/UL (ref 0–0.2)
BASOPHILS NFR BLD: 0.4 % (ref 0–1.9)
BILIRUB SERPL-MCNC: 0.3 MG/DL (ref 0.1–1)
BUN SERPL-MCNC: 29 MG/DL (ref 8–23)
CALCIUM SERPL-MCNC: 10.1 MG/DL (ref 8.7–10.5)
CHLORIDE SERPL-SCNC: 107 MMOL/L (ref 95–110)
CO2 SERPL-SCNC: 26 MMOL/L (ref 23–29)
CREAT SERPL-MCNC: 0.9 MG/DL (ref 0.5–1.4)
DIFFERENTIAL METHOD BLD: NORMAL
EOSINOPHIL # BLD AUTO: 0.4 K/UL (ref 0–0.5)
EOSINOPHIL NFR BLD: 5 % (ref 0–8)
ERYTHROCYTE [DISTWIDTH] IN BLOOD BY AUTOMATED COUNT: 13.4 % (ref 11.5–14.5)
EST. GFR  (NO RACE VARIABLE): >60 ML/MIN/1.73 M^2
GLUCOSE SERPL-MCNC: 94 MG/DL (ref 70–110)
HCT VFR BLD AUTO: 38 % (ref 37–48.5)
HGB BLD-MCNC: 12.4 G/DL (ref 12–16)
IMM GRANULOCYTES # BLD AUTO: 0.02 K/UL (ref 0–0.04)
IMM GRANULOCYTES NFR BLD AUTO: 0.3 % (ref 0–0.5)
LYMPHOCYTES # BLD AUTO: 1.9 K/UL (ref 1–4.8)
LYMPHOCYTES NFR BLD: 23.9 % (ref 18–48)
MCH RBC QN AUTO: 31 PG (ref 27–31)
MCHC RBC AUTO-ENTMCNC: 32.6 G/DL (ref 32–36)
MCV RBC AUTO: 95 FL (ref 82–98)
MONOCYTES # BLD AUTO: 0.8 K/UL (ref 0.3–1)
MONOCYTES NFR BLD: 10.6 % (ref 4–15)
NEUTROPHILS # BLD AUTO: 4.8 K/UL (ref 1.8–7.7)
NEUTROPHILS NFR BLD: 59.8 % (ref 38–73)
NRBC BLD-RTO: 0 /100 WBC
PLATELET # BLD AUTO: 332 K/UL (ref 150–450)
PMV BLD AUTO: 10.8 FL (ref 9.2–12.9)
POTASSIUM SERPL-SCNC: 3.9 MMOL/L (ref 3.5–5.1)
PROT SERPL-MCNC: 7.1 G/DL (ref 6–8.4)
RBC # BLD AUTO: 4 M/UL (ref 4–5.4)
SODIUM SERPL-SCNC: 142 MMOL/L (ref 136–145)
WBC # BLD AUTO: 7.94 K/UL (ref 3.9–12.7)

## 2024-04-16 PROCEDURE — 85025 COMPLETE CBC W/AUTO DIFF WBC: CPT | Performed by: COLON & RECTAL SURGERY

## 2024-04-16 PROCEDURE — 36415 COLL VENOUS BLD VENIPUNCTURE: CPT | Performed by: COLON & RECTAL SURGERY

## 2024-04-16 PROCEDURE — 80053 COMPREHEN METABOLIC PANEL: CPT | Performed by: COLON & RECTAL SURGERY

## (undated) DEVICE — APPLICATOR CHLORAPREP ORN 26ML

## (undated) DEVICE — TROCAR ENDOPATH XCEL 5MM 7.5CM

## (undated) DEVICE — BAG TISS RETRV MONARCH 10MM

## (undated) DEVICE — SUT MCRYL PLUS 4-0 PS2 27IN

## (undated) DEVICE — TROCAR ENDOPATH XCEL 5X75MM

## (undated) DEVICE — COVER MAYO STND XL 30X57IN

## (undated) DEVICE — Device

## (undated) DEVICE — TRAY CATH FOL SIL URIMTR 16FR

## (undated) DEVICE — RELOAD ECHELON FLEX GRN 60MM

## (undated) DEVICE — SYR IRRIGATION BULB STER 60ML

## (undated) DEVICE — APPLIER CLIP ENDO MED/LG 10MM

## (undated) DEVICE — KIT PROCEDURE STER INLET CLOSU

## (undated) DEVICE — TIP YANKAUERS BULB NO VENT

## (undated) DEVICE — KIT VUETIP TROCAR SWAB

## (undated) DEVICE — CLOSURE SKIN STERI-STRIP 1X5IN

## (undated) DEVICE — SEE MEDLINE ITEM 156902

## (undated) DEVICE — DRAPE INCISE IOBAN 2 23X17IN

## (undated) DEVICE — SEALER LIGASURE LAP 37CM 5MM

## (undated) DEVICE — DRESSING LEUKOPLAST FLEX 1X3IN

## (undated) DEVICE — IRRIGATOR ENDOSCOPY DISP.

## (undated) DEVICE — SYR ONLY LUER LOCK 20CC

## (undated) DEVICE — COVER LIGHT HANDLE 80/CA

## (undated) DEVICE — DRAPE ABDOMINAL TIBURON 14X11

## (undated) DEVICE — TROCAR KII BLLN 12MM 10CM

## (undated) DEVICE — SCISSOR 5MMX35CM DIRECT DRIVE

## (undated) DEVICE — NDL 22GA X1 1/2 REG BEVEL

## (undated) DEVICE — BOWL STERILE LARGE 32OZ

## (undated) DEVICE — KIT ANTIFOG W/SPONG & FLUID

## (undated) DEVICE — STAPLER ECHELON FLEX 60MM 44CM

## (undated) DEVICE — CUTTER PROXIMATE BLUE 100MM

## (undated) DEVICE — RELOAD PROXIMATE CUT BLU 100MM

## (undated) DEVICE — DRAPE CORETEMP FLD WRM 56X62IN

## (undated) DEVICE — DRESSING ABSRBNT ISLAND 3.6X8

## (undated) DEVICE — PAD PINK TRENDELENBURG POS XL

## (undated) DEVICE — TUBING HF INSUFFLATION W/ FLTR

## (undated) DEVICE — MARKER SKIN STND TIP BLUE BARR

## (undated) DEVICE — BLADE SURG CARBON STEEL SZ11

## (undated) DEVICE — NDL BOX COUNTER

## (undated) DEVICE — TRAY MINOR GEN SURG OMC

## (undated) DEVICE — SUT 0 VICRYL / UR6 (J603)

## (undated) DEVICE — SUT 3-0 VICRYL SH CR/8 18

## (undated) DEVICE — CHLORAPREP TNT2%SOL10.5ML TEAL

## (undated) DEVICE — SUT COATED VICRYL 4/0 27IN

## (undated) DEVICE — ELECTRODE REM PLYHSV RETURN 9